# Patient Record
Sex: FEMALE | Race: WHITE | NOT HISPANIC OR LATINO | Employment: UNEMPLOYED | URBAN - METROPOLITAN AREA
[De-identification: names, ages, dates, MRNs, and addresses within clinical notes are randomized per-mention and may not be internally consistent; named-entity substitution may affect disease eponyms.]

---

## 2019-01-03 ENCOUNTER — OFFICE VISIT (OUTPATIENT)
Dept: FAMILY MEDICINE CLINIC | Facility: CLINIC | Age: 59
End: 2019-01-03
Payer: COMMERCIAL

## 2019-01-03 VITALS
SYSTOLIC BLOOD PRESSURE: 110 MMHG | OXYGEN SATURATION: 99 % | TEMPERATURE: 98.7 F | DIASTOLIC BLOOD PRESSURE: 76 MMHG | HEART RATE: 75 BPM

## 2019-01-03 DIAGNOSIS — J06.9 ACUTE URI: Primary | ICD-10-CM

## 2019-01-03 PROBLEM — G89.29 CHRONIC PAIN: Status: ACTIVE | Noted: 2019-01-03

## 2019-01-03 PROBLEM — M77.10 LATERAL EPICONDYLITIS: Status: ACTIVE | Noted: 2019-01-03

## 2019-01-03 PROBLEM — M25.539 WRIST JOINT PAIN: Status: ACTIVE | Noted: 2019-01-03

## 2019-01-03 PROCEDURE — 99213 OFFICE O/P EST LOW 20 MIN: CPT | Performed by: FAMILY MEDICINE

## 2019-01-03 RX ORDER — VALACYCLOVIR HYDROCHLORIDE 1 G/1
1000 TABLET, FILM COATED ORAL 2 TIMES DAILY
Qty: 14 TABLET | Refills: 2 | Status: SHIPPED | OUTPATIENT
Start: 2019-01-03 | End: 2020-09-25

## 2019-01-03 NOTE — PROGRESS NOTES
Assessment/Plan:       Problem List Items Addressed This Visit     Acute URI - Primary     Patient presents for URI symptoms present for about a week slightly improving at this point with sore throat dry cough no ear pain no fever at this time  Observe treat symptoms with Tylenol and fluids call if worse                 Subjective:      Patient ID: Benjamin England is a 63 y o  female  Patient presents with acute URI symptoms present for 1 week gradually were improving at this point she comes in today for follow-up evaluation to check and see if symptoms need further treatment at this time point  URI    Associated symptoms include coughing, rhinorrhea and a sore throat  Pertinent negatives include no abdominal pain, chest pain, congestion, dysuria, headaches, nausea or rash  The following portions of the patient's history were reviewed and updated as appropriate: allergies, current medications, past family history, past medical history, past social history, past surgical history and problem list     Review of Systems   Constitutional: Negative for chills, fatigue and fever  HENT: Positive for rhinorrhea and sore throat  Negative for congestion, nosebleeds and sinus pressure  Eyes: Negative for discharge and redness  Respiratory: Positive for cough  Negative for shortness of breath  Cardiovascular: Negative for chest pain, palpitations and leg swelling  Gastrointestinal: Negative for abdominal pain, blood in stool and nausea  Endocrine: Negative for cold intolerance, heat intolerance and polyuria  Genitourinary: Negative for dysuria and frequency  Musculoskeletal: Negative for arthralgias, back pain and myalgias  Skin: Negative for rash  Neurological: Negative for dizziness, weakness and headaches  Hematological: Negative for adenopathy  Psychiatric/Behavioral: Negative for behavioral problems and sleep disturbance  The patient is not nervous/anxious  Objective:      /76   Pulse 75   Temp 98 7 °F (37 1 °C)   SpO2 99%        Physical Exam   Constitutional: She is oriented to person, place, and time  She appears well-developed and well-nourished  No distress  HENT:   Head: Normocephalic and atraumatic  Right Ear: External ear normal    Left Ear: External ear normal    Nose: Nose normal    Mouth/Throat: Oropharynx is clear and moist  No oropharyngeal exudate  Eyes: Pupils are equal, round, and reactive to light  Conjunctivae and EOM are normal  Right eye exhibits no discharge  Left eye exhibits no discharge  No scleral icterus  Neck: Normal range of motion  No JVD present  No thyromegaly present  Cardiovascular: Normal rate, regular rhythm and normal heart sounds  No murmur heard  Pulmonary/Chest: Effort normal  She has no wheezes  She has no rales  She exhibits no tenderness  Abdominal: Soft  Bowel sounds are normal  She exhibits no distension and no mass  There is no tenderness  Musculoskeletal: Normal range of motion  She exhibits no edema, tenderness or deformity  Lymphadenopathy:     She has no cervical adenopathy  Neurological: She is alert and oriented to person, place, and time  She has normal reflexes  No cranial nerve deficit  Coordination normal    Skin: Skin is warm and dry  No rash noted  Psychiatric: She has a normal mood and affect  Her behavior is normal  Judgment and thought content normal    Nursing note and vitals reviewed         Data:    Laboratory Results:  Radiology/Other Diagnostic Testing Results:     No results found for: WBC, HGB, HCT, MCV, PLT  No results found for: NA, K, CL, CO2, ANIONGAP, BUN, CREATININE, GLUCOSE, GLUF, CALCIUM, CORRECTEDCA, AST, ALT, ALKPHOS, PROT, BILITOT, EGFR  No results found for: CHOLESTEROL  No results found for: HDL  No results found for: LDLCALC  No results found for: TRIG  No results found for: CHOLHDL  No results found for: [id number], TSH  No results found for: HGBA1C  No results found for: MALICK Ching, DO

## 2019-01-03 NOTE — ASSESSMENT & PLAN NOTE
Patient presents for URI symptoms present for about a week slightly improving at this point with sore throat dry cough no ear pain no fever at this time    Observe treat symptoms with Tylenol and fluids call if worse

## 2019-01-04 ENCOUNTER — TELEPHONE (OUTPATIENT)
Dept: OTHER | Facility: OTHER | Age: 59
End: 2019-01-04

## 2019-01-11 ENCOUNTER — TELEPHONE (OUTPATIENT)
Dept: FAMILY MEDICINE CLINIC | Facility: CLINIC | Age: 59
End: 2019-01-11

## 2019-01-11 DIAGNOSIS — R39.9 UTI SYMPTOMS: Primary | ICD-10-CM

## 2019-01-11 NOTE — TELEPHONE ENCOUNTER
Needs a rx faxed to [facility]     Poss   uti wants a rx for it please      Fax rx to her work fax  [phone]

## 2019-01-16 ENCOUNTER — TELEPHONE (OUTPATIENT)
Dept: FAMILY MEDICINE CLINIC | Facility: CLINIC | Age: 59
End: 2019-01-16

## 2019-01-16 NOTE — TELEPHONE ENCOUNTER
Call patient regarding her urinalysis results  It does show a few white blood cells in the specimen but negative for bacteria or any other problems or abnormalities  If she has having any symptoms or requires further treatment let me know  The white blood cell elements could be from a recent minor infection that is still showing up at this time  However if she feels worse or any changes occur over the next week we can repeat the urinalysis to be sure it is clear

## 2019-02-22 ENCOUNTER — OFFICE VISIT (OUTPATIENT)
Dept: FAMILY MEDICINE CLINIC | Facility: CLINIC | Age: 59
End: 2019-02-22
Payer: COMMERCIAL

## 2019-02-22 VITALS
HEART RATE: 68 BPM | WEIGHT: 141 LBS | SYSTOLIC BLOOD PRESSURE: 124 MMHG | OXYGEN SATURATION: 98 % | DIASTOLIC BLOOD PRESSURE: 68 MMHG

## 2019-02-22 DIAGNOSIS — R11.0 NAUSEA: ICD-10-CM

## 2019-02-22 DIAGNOSIS — R42 DIZZINESS: Primary | ICD-10-CM

## 2019-02-22 PROCEDURE — 99213 OFFICE O/P EST LOW 20 MIN: CPT | Performed by: FAMILY MEDICINE

## 2019-02-22 NOTE — PROGRESS NOTES
Assessment/Plan:       Problem List Items Addressed This Visit        Other    Dizziness - Primary     Patient complains of dizziness and fatigue feeling run down over the last 2 weeks she has had episodes of nausea with some discomfort and vomiting associated with change in bowel or bladder no fever chills associated with this  I will order lab work to follow up on this  If it continues we will order a CT of the head she is aware of this and will observe for change in symptoms physical exam is benign today I will refer her to Neurology if further workup is needed she wants to wait and see how things change over the next several weeks I recommend she stay hydrated take a multiple vitamin every day and get proper nutrition and rest          Nausea     Patient has had nausea with a couple episodes of vomiting denies gastritis or reflux or dyspepsia at this time  I do recommend starting with an H2 blocker such as Zantac or Pepcid if symptoms continue  She will watch her diet and watch for changes  Subjective:      Patient ID: Alaina Diaz is a 63 y o  female  Patient notes feeling run down and fatigued over the last several weeks mild postnasal drip otherwise no cough fever chills a change in bowel or bladder associated with this  Ppatient notes dizziness with some nausea and occasional episodes of vomiting cannot contribute it to any type of particular food she has also had loose bowels with diarrhea  Patient developed diarrhea on February 9th likely infectious as 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 there was an outbreak of diarrhea and a gastrointestinal virus which she caught it lasted for several days she became weak from this and dizzy  She recovered slowly however still feels drained at this point very weak and dizzy        The following portions of the patient's history were reviewed and updated as appropriate: allergies, current medications, past family history, past medical history, past social history, past surgical history and problem list     Review of Systems   Constitutional: Positive for fatigue  Negative for chills and fever  HENT: Positive for postnasal drip  Negative for congestion, nosebleeds, rhinorrhea, sinus pressure and sore throat  Eyes: Negative for discharge and redness  Respiratory: Negative for cough and shortness of breath  Cardiovascular: Negative for chest pain, palpitations and leg swelling  Gastrointestinal: Negative for abdominal pain, blood in stool and nausea  Endocrine: Negative for cold intolerance, heat intolerance and polyuria  Genitourinary: Negative for dysuria and frequency  Musculoskeletal: Negative for arthralgias, back pain and myalgias  Skin: Negative for rash  Neurological: Negative for dizziness, weakness and headaches  Hematological: Negative for adenopathy  Psychiatric/Behavioral: Negative for behavioral problems and sleep disturbance  The patient is not nervous/anxious  Objective:      /68 (BP Location: Left arm, Patient Position: Sitting)   Pulse 68   Wt 64 kg (141 lb)   SpO2 98%        Physical Exam   Constitutional: She is oriented to person, place, and time  She appears well-developed and well-nourished  No distress  HENT:   Head: Normocephalic and atraumatic  Right Ear: External ear normal    Left Ear: External ear normal    Nose: Nose normal    Mouth/Throat: Oropharynx is clear and moist  No oropharyngeal exudate  Eyes: Pupils are equal, round, and reactive to light  Conjunctivae and EOM are normal  Right eye exhibits no discharge  Left eye exhibits no discharge  No scleral icterus  Neck: Normal range of motion  No JVD present  No thyromegaly present  Cardiovascular: Normal rate, regular rhythm and normal heart sounds  No murmur heard  Pulmonary/Chest: Effort normal  She has no wheezes  She has no rales  She exhibits no tenderness  Abdominal: Soft   Bowel sounds are normal  She exhibits no distension and no mass  There is no tenderness  Musculoskeletal: Normal range of motion  She exhibits no edema, tenderness or deformity  Lymphadenopathy:     She has no cervical adenopathy  Neurological: She is alert and oriented to person, place, and time  She has normal reflexes  She displays normal reflexes  No cranial nerve deficit  Coordination normal    Skin: Skin is warm and dry  No rash noted  Psychiatric: She has a normal mood and affect  Her behavior is normal  Judgment and thought content normal    Nursing note and vitals reviewed  Data:    Laboratory Results: I have personally reviewed the pertinent laboratory results/reports   Radiology/Other Diagnostic Testing Results: I have personally reviewed pertinent reports         No results found for: WBC, HGB, HCT, MCV, PLT  No results found for: NA, K, CL, CO2, ANIONGAP, BUN, CREATININE, GLUCOSE, GLUF, CALCIUM, CORRECTEDCA, AST, ALT, ALKPHOS, PROT, BILITOT, EGFR  No results found for: CHOLESTEROL  No results found for: HDL  No results found for: LDLCALC  No results found for: TRIG  No results found for: CHOLHDL  No results found for: [id number], TSH  No results found for: HGBA1C  No results found for: PSA    [place], DO

## 2019-02-22 NOTE — ASSESSMENT & PLAN NOTE
Patient complains of dizziness and fatigue feeling run down over the last 2 weeks she has had episodes of nausea with some discomfort and vomiting associated with change in bowel or bladder no fever chills associated with this  I will order lab work to follow up on this    If it continues we will order a CT of the head she is aware of this and will observe for change in symptoms physical exam is benign today I will refer her to Neurology if further workup is needed she wants to wait and see how things change over the next several weeks I recommend she stay hydrated take a multiple vitamin every day and get proper nutrition and rest

## 2019-02-22 NOTE — ASSESSMENT & PLAN NOTE
Patient has had nausea with a couple episodes of vomiting denies gastritis or reflux or dyspepsia at this time  I do recommend starting with an H2 blocker such as Zantac or Pepcid if symptoms continue  She will watch her diet and watch for changes

## 2020-03-16 ENCOUNTER — TELEPHONE (OUTPATIENT)
Dept: FAMILY MEDICINE CLINIC | Facility: CLINIC | Age: 60
End: 2020-03-16

## 2020-03-16 NOTE — TELEPHONE ENCOUNTER
Needs letter for work stating she cannot be at work, works at [employer] and she has RSV   Fax to [phone]

## 2020-03-16 NOTE — TELEPHONE ENCOUNTER
I will need specific information on her with a diagnosis possibly from old records I do not have anything in my records signifying her medical need for remaining out of work or employment due to a criteria that matches the CDC current recommendations right now possibly in old records or she can provide me with additional information or medical assess [place] so I can provide a form and she may need official evaluation but 1st check in past medical history if something is available in amazing charts her otherwise?

## 2020-03-17 NOTE — TELEPHONE ENCOUNTER
Spoke to patient and she said that RSD is at greater risk of developing infections and the body has problems producing antibodies to fight infection  She has already discussed this with them at [facility]   If you cannot do it she will have to quit

## 2020-03-17 NOTE — TELEPHONE ENCOUNTER
Wendi Monk contact this patient in light of her request for possibly staying out of work  She has are SD and this does not fall under a specific category of immunocompromised conditions by CDC recommendations for the current Covid 19  She can discuss this further with her employer and see if they would allow her the next 10 days to remain out  Let her know that many requests coming in for different jobs and these issues need to also be clarified with patient's and employers  And I need to follow the specific guidelines provided to physicians   If she has specific other reason then or other comments to discuss she can come in and make an appointment with me to discuss this personally on individual case basis

## 2020-03-17 NOTE — TELEPHONE ENCOUNTER
I checked AC, the correct dx is RSD not RSV,  I printed the last 2 office notes but they are from 2017, I put them in your PDMP box

## 2020-03-17 NOTE — TELEPHONE ENCOUNTER
As I stated earlier I can provide her with a note of her employer is okay with this    Check the date on her last appointment here with me if she has been seen within the last year I can write her a note without her coming in if she has not been seen in over 1 year she will need an appointment in order to obtain a work excuse note additionally she needs to tell me the time frame on her absence from work

## 2020-04-29 ENCOUNTER — TELEPHONE (OUTPATIENT)
Dept: FAMILY MEDICINE CLINIC | Facility: CLINIC | Age: 60
End: 2020-04-29

## 2020-09-11 DIAGNOSIS — Z12.31 ENCOUNTER FOR SCREENING MAMMOGRAM FOR BREAST CANCER: Primary | ICD-10-CM

## 2020-09-25 ENCOUNTER — OFFICE VISIT (OUTPATIENT)
Dept: FAMILY MEDICINE CLINIC | Facility: CLINIC | Age: 60
End: 2020-09-25
Payer: COMMERCIAL

## 2020-09-25 VITALS
HEIGHT: 71 IN | BODY MASS INDEX: 20.16 KG/M2 | HEART RATE: 99 BPM | SYSTOLIC BLOOD PRESSURE: 120 MMHG | OXYGEN SATURATION: 87 % | DIASTOLIC BLOOD PRESSURE: 70 MMHG | WEIGHT: 144 LBS | TEMPERATURE: 97.9 F

## 2020-09-25 DIAGNOSIS — L30.9 FOOT DERMATITIS: ICD-10-CM

## 2020-09-25 DIAGNOSIS — M25.561 ACUTE PAIN OF RIGHT KNEE: Primary | ICD-10-CM

## 2020-09-25 DIAGNOSIS — G90.50 RSD (REFLEX SYMPATHETIC DYSTROPHY): ICD-10-CM

## 2020-09-25 PROCEDURE — 99214 OFFICE O/P EST MOD 30 MIN: CPT | Performed by: FAMILY MEDICINE

## 2020-09-25 PROCEDURE — 3725F SCREEN DEPRESSION PERFORMED: CPT | Performed by: FAMILY MEDICINE

## 2020-09-25 NOTE — PATIENT INSTRUCTIONS
Knee Sprain   WHAT YOU NEED TO KNOW:   A knee sprain occurs when one or more ligaments in your knee are suddenly stretched or torn  Ligaments are tissues that hold bones together  Ligaments support the knee and keep the joint and bones in the correct position  DISCHARGE INSTRUCTIONS:   Return to the emergency department if:   · Any part of your leg feels cold, numb, or looks pale     Contact your healthcare provider if:   · You have new or increased swelling, bruising, or pain in your knee  · Your symptoms do not improve within 6 weeks, even with treatment  · You have questions or concerns about your condition or care  Medicines:   · NSAIDs , such as ibuprofen, help decrease swelling, pain, and fever  This medicine is available with or without a doctor's order  NSAIDs can cause stomach bleeding or kidney problems in certain people  If you take blood thinner medicine, always ask your healthcare provider if NSAIDs are safe for you  Always read the medicine label and follow directions  · Acetaminophen  decreases pain and fever  It is available without a doctor's order  Ask how much to take and how often to take it  Follow directions  Read the labels of all other medicines you are using to see if they also contain acetaminophen, or ask your doctor or pharmacist  Acetaminophen can cause liver damage if not taken correctly  Do not use more than 4 grams (4,000 milligrams) total of acetaminophen in one day  · Prescription pain medicine  may be given  Ask how to take this medicine safely  · Take your medicine as directed  Contact your healthcare provider if you think your medicine is not helping or if you have side effects  Tell him or her if you are allergic to any medicine  Keep a list of the medicines, vitamins, and herbs you take  Include the amounts, and when and why you take them  Bring the list or the pill bottles to follow-up visits   Carry your medicine list with you in case of an emergency  Self-care:   · Rest  your knee and do not exercise  You may be told to keep weight off your knee  This means that you should not walk on your injured leg  Rest helps decrease swelling and allows the injury to heal  You can do gentle range of motion (ROM) exercises as directed  This will prevent stiffness  · Apply ice  on your knee for 15 to 20 minutes every hour or as directed  Use an ice pack, or put crushed ice in a plastic bag  Cover it with a towel  Ice helps prevent tissue damage and decreases swelling and pain  · Apply compression to your knee as directed  You may need to wear an elastic bandage  This helps keep your injured knee from moving too much while it heals  You can loosen or tighten the elastic bandage to make it comfortable  It should be tight enough for you to feel support  It should not be so tight that it causes your toes to feel numb or tingly  If you are wearing an elastic bandage, take it off and rewrap it once a day  · Elevate your knee  above the level of your heart as often as you can  This will help decrease swelling and pain  Prop your leg on pillows or blankets to keep it elevated comfortably  Do not put pillows directly behind your knee  · Use support devices as directed:  Support devices such as a splint or brace may be needed  These devices limit movement and protect your joint while it heals  You may be given crutches to use until you can stand on your injured leg without pain  Use devices as directed  Physical therapy:  A physical therapist teaches you exercises to help improve movement and strength, and to decrease pain  Prevent another knee sprain:  Exercise your legs to keep your muscles strong  Strong leg muscles help protect your knee and prevent strain  The following may also prevent a knee sprain:  · Slowly start your exercise or training program   Slowly increase the time, distance, and intensity of your exercise   Sudden increases in training may cause you to injure your knee again  · Wear protective braces and equipment as directed  Braces may prevent your knee from moving the wrong way and causing another sprain  Protective equipment may support your bones and ligaments to prevent injury  · Warm up and stretch before exercise  Warm up by walking or using an exercise bike before starting your regular exercise  Do gentle stretches after warming up  This helps to loosen your muscles and decrease stress on your knee  Cool down and stretch after you exercise  · Wear shoes that fit correctly and support your feet  Replace your running or exercise shoes before the padding or shock absorption is worn out  Ask your healthcare provider which exercise shoes are best for you  Ask if you should wear special shoe inserts  Shoe inserts can help support your heels and arches or keep your foot lined up correctly in your shoes  Exercise on flat surfaces  Follow up with your healthcare provider as directed:  Write down your questions so you remember to ask them during your visits  © 2017 [facility] [place] Information is for End User's use only and may not be sold, redistributed or otherwise used for commercial purposes  All illustrations and images included in CareNotes® are the copyrighted property of [employer] , [employer]  or Diego Nina  The above information is an  only  It is not intended as medical advice for individual conditions or treatments  Talk to your doctor, nurse or pharmacist before following any medical regimen to see if it is safe and effective for you

## 2020-09-25 NOTE — ASSESSMENT & PLAN NOTE
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 walking but recently started walking her dog on uneven surfaces and the knees aggravated again on examination positive Maria Del Rosario's sign with pain and crepitance in the medial meniscus region and is likely that she has an internal derangement with the medial meniscus and possibly medial collateral ligament  Both her mild and she will do quadriceps strengthening exercises at home for this including straight leg raising and leg extension exercises from a seated position she can be referred over to physical therapy if symptoms do not improve she is going to use ice and moist heat now    I recommend diclofenac gel twice daily she will follow-up with me in contact me after 2 weeks

## 2020-09-25 NOTE — ASSESSMENT & PLAN NOTE
RST by history patient is concerned about her right leg pain as to relating to this condition however after examination and explanation patient is reassured this is not related to her RSD

## 2020-09-25 NOTE — ASSESSMENT & PLAN NOTE
3 centimeter circumscribed erythematous rash on inner aspect of right foot below her heel at the plantar arch she will apply hydrocortisone 2 5 percent cream over this area now and contact me if not improved I recommend wearing socks and shoes over the next several weeks to protect this area

## 2020-09-25 NOTE — PROGRESS NOTES
Assessment/Plan:       Problem List Items Addressed This Visit        Nervous and Auditory    RSD (reflex sympathetic dystrophy)      RST by history patient is concerned about her right leg pain as to relating to this condition however after examination and explanation patient is reassured this is not related to her RSD            Musculoskeletal and Integument    Foot dermatitis      3 centimeter circumscribed erythematous rash on inner aspect of right foot below her heel at the plantar arch she will apply hydrocortisone 2 5 percent cream over this area now and contact me if not improved I recommend wearing socks and shoes over the next several weeks to protect this area            Other    Acute pain of right knee - Primary     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 walking but recently started walking her dog on uneven surfaces and the knees aggravated again on examination positive Maria Del Rosario's sign with pain and crepitance in the medial meniscus region and is likely that she has an internal derangement with the medial meniscus and possibly medial collateral ligament  Both her mild and she will do quadriceps strengthening exercises at home for this including straight leg raising and leg extension exercises from a seated position she can be referred over to physical therapy if symptoms do not improve she is going to use ice and moist heat now  I recommend diclofenac gel twice daily she will follow-up with me in contact me after 2 weeks                 Subjective:      Patient ID: Lori Adams is a 62 y o  female      Patient presents for right knee pain which has been bothering her over the last several weeks and swelling at times      The following portions of the patient's history were reviewed and updated as appropriate: allergies, current medications, past family history, past medical history, past social history, past surgical history and problem list     Review of Systems   Constitutional: Negative for chills, fatigue and fever  HENT: Negative for congestion, nosebleeds, rhinorrhea, sinus pressure and sore throat  Eyes: Negative for discharge and redness  Respiratory: Negative for cough and shortness of breath  Cardiovascular: Negative for chest pain, palpitations and leg swelling  Gastrointestinal: Negative for abdominal pain, blood in stool and nausea  Endocrine: Negative for cold intolerance, heat intolerance and polyuria  Genitourinary: Negative for dysuria and frequency  Musculoskeletal: Negative for arthralgias, back pain and myalgias  Skin: Negative for rash  Neurological: Negative for dizziness, weakness and headaches  Hematological: Negative for adenopathy  Psychiatric/Behavioral: Negative for behavioral problems and sleep disturbance  The patient is not nervous/anxious  Objective:      /70 (BP Location: Left arm, Patient Position: Sitting)   Pulse 99   Temp 97 9 °F (36 6 °C)   Ht 5' 11" (1 803 m)   Wt 65 3 kg (144 lb)   SpO2 (!) 87%   BMI 20 08 kg/m²        Physical Exam  Vitals signs and nursing note reviewed  Constitutional:       General: She is not in acute distress  Appearance: She is well-developed  HENT:      Head: Normocephalic and atraumatic  Right Ear: External ear normal       Left Ear: External ear normal       Nose: Nose normal       Mouth/Throat:      Pharynx: No oropharyngeal exudate  Eyes:      General: No scleral icterus  Right eye: No discharge  Left eye: No discharge        Conjunctiva/sclera: Conjunctivae normal       Pupils: Pupils are equal, round, and reactive to light  Neck:      Musculoskeletal: Normal range of motion  Thyroid: No thyromegaly  Vascular: No JVD  Cardiovascular:      Rate and Rhythm: Normal rate and regular rhythm  Heart sounds: Normal heart sounds  No murmur  Pulmonary:      Effort: Pulmonary effort is normal       Breath sounds: No wheezing or rales  Chest:      Chest wall: No tenderness  Abdominal:      General: Bowel sounds are normal  There is no distension  Palpations: Abdomen is soft  There is no mass  Tenderness: There is no abdominal tenderness  Musculoskeletal:         General: No tenderness or deformity  Comments: Right knee pain medial meniscus and positive Maria Del Rosario's   Lymphadenopathy:      Cervical: No cervical adenopathy  Skin:     General: Skin is warm and dry  Findings: No rash  Neurological:      Mental Status: She is alert and oriented to person, place, and time  Cranial Nerves: No cranial nerve deficit  Coordination: Coordination normal       Deep Tendon Reflexes: Reflexes are normal and symmetric  Reflexes normal    Psychiatric:         Behavior: Behavior normal          Thought Content:  Thought content normal          Judgment: Judgment normal           Data:    Laboratory Results:   Radiology/Other Diagnostic Testing Results:      No results found for: WBC, HGB, HCT, MCV, PLT  No results found for: NA, K, CL, CO2, ANIONGAP, BUN, CREATININE, GLUCOSE, GLUF, CALCIUM, CORRECTEDCA, AST, ALT, ALKPHOS, PROT, BILITOT, EGFR  No results found for: CHOLESTEROL  No results found for: HDL  No results found for: LDLCALC  No results found for: TRIG  No results found for: CHOLHDL  No results found for: [id number], TSH  No results found for: HGBA1C  No results found for: MALICK Gomez,

## 2020-10-14 ENCOUNTER — OFFICE VISIT (OUTPATIENT)
Dept: FAMILY MEDICINE CLINIC | Facility: CLINIC | Age: 60
End: 2020-10-14
Payer: COMMERCIAL

## 2020-10-14 VITALS
HEIGHT: 71 IN | BODY MASS INDEX: 20.24 KG/M2 | DIASTOLIC BLOOD PRESSURE: 72 MMHG | SYSTOLIC BLOOD PRESSURE: 122 MMHG | HEART RATE: 87 BPM | TEMPERATURE: 98.4 F | WEIGHT: 144.6 LBS | OXYGEN SATURATION: 98 %

## 2020-10-14 DIAGNOSIS — H83.01 LABYRINTHITIS OF RIGHT EAR: Primary | ICD-10-CM

## 2020-10-14 PROCEDURE — 99213 OFFICE O/P EST LOW 20 MIN: CPT | Performed by: FAMILY MEDICINE

## 2020-10-14 RX ORDER — MECLIZINE HCL 12.5 MG/1
25 TABLET ORAL 3 TIMES DAILY PRN
Qty: 30 TABLET | Refills: 0 | Status: SHIPPED | OUTPATIENT
Start: 2020-10-14

## 2021-08-26 ENCOUNTER — OFFICE VISIT (OUTPATIENT)
Dept: FAMILY MEDICINE CLINIC | Facility: CLINIC | Age: 61
End: 2021-08-26
Payer: COMMERCIAL

## 2021-08-26 VITALS
OXYGEN SATURATION: 97 % | HEART RATE: 82 BPM | WEIGHT: 143 LBS | HEIGHT: 71 IN | BODY MASS INDEX: 20.02 KG/M2 | SYSTOLIC BLOOD PRESSURE: 122 MMHG | RESPIRATION RATE: 18 BRPM | TEMPERATURE: 99.3 F | DIASTOLIC BLOOD PRESSURE: 64 MMHG

## 2021-08-26 DIAGNOSIS — H92.01 RIGHT EAR PAIN: Primary | ICD-10-CM

## 2021-08-26 DIAGNOSIS — R10.13 EPIGASTRIC ABDOMINAL PAIN: ICD-10-CM

## 2021-08-26 DIAGNOSIS — Z13.0 SCREENING FOR BLOOD DISEASE: ICD-10-CM

## 2021-08-26 DIAGNOSIS — Z13.29 SCREENING FOR THYROID DISORDER: Primary | ICD-10-CM

## 2021-08-26 DIAGNOSIS — K58.0 IRRITABLE BOWEL SYNDROME WITH DIARRHEA: ICD-10-CM

## 2021-08-26 DIAGNOSIS — Z13.228 SCREENING FOR METABOLIC DISORDER: ICD-10-CM

## 2021-08-26 DIAGNOSIS — Z13.220 SCREENING, LIPID: ICD-10-CM

## 2021-08-26 PROCEDURE — 99214 OFFICE O/P EST MOD 30 MIN: CPT | Performed by: FAMILY MEDICINE

## 2021-08-26 PROCEDURE — 3008F BODY MASS INDEX DOCD: CPT | Performed by: FAMILY MEDICINE

## 2021-08-26 RX ORDER — PANTOPRAZOLE SODIUM 40 MG/1
40 TABLET, DELAYED RELEASE ORAL
Qty: 30 TABLET | Refills: 5 | Status: SHIPPED | OUTPATIENT
Start: 2021-08-26

## 2021-08-26 NOTE — ASSESSMENT & PLAN NOTE
Patient came in concerned about blockage in her right ear and ringing in the ear at times this is more likely related to eustachian tube dysfunction as her ear is clear she wanted it checked for earwax buildup    She will now use meclizine as needed and Flonase and add a decongestant like Sudafed 12 hour if needed if symptoms do not improve she will contact me in 1 week for possibility of adding prednisone but will hold on this for now as her stomach has been upset with gastritis and epigastric pain

## 2021-08-26 NOTE — ASSESSMENT & PLAN NOTE
Patient has several days of diarrhea and became somewhat dehydrated and then developed epigastric pain symptoms since have resolved but she continues with epigastric pain will treat this with pantoprazole now for 2 weeks  She will contact me if symptoms change or worsen    She notes that she does not have blood in her stool but will watch for this further referral to Gastroenterology can be done if symptoms do not improve

## 2021-08-26 NOTE — ASSESSMENT & PLAN NOTE
This is likely due to stress and patient additionally has GERD symptoms with this with burning into her throat I recommend that she start on a proton pump inhibitor over the next 2 weeks and then contact me with a follow-up evaluation and possibly referral for gastroenterology evaluation

## 2021-08-26 NOTE — PROGRESS NOTES
Assessment/Plan:       Problem List Items Addressed This Visit        Digestive    Irritable bowel syndrome with diarrhea     Patient has several days of diarrhea and became somewhat dehydrated and then developed epigastric pain symptoms since have resolved but she continues with epigastric pain will treat this with pantoprazole now for 2 weeks  She will contact me if symptoms change or worsen  She notes that she does not have blood in her stool but will watch for this further referral to Gastroenterology can be done if symptoms do not improve            Other    Right ear pain - Primary      Patient came in concerned about blockage in her right ear and ringing in the ear at times this is more likely related to eustachian tube dysfunction as her ear is clear she wanted it checked for earwax buildup  She will now use meclizine as needed and Flonase and add a decongestant like Sudafed 12 hour if needed if symptoms do not improve she will contact me in 1 week for possibility of adding prednisone but will hold on this for now as her stomach has been upset with gastritis and epigastric pain         Epigastric abdominal pain      This is likely due to stress and patient additionally has GERD symptoms with this with burning into her throat I recommend that she start on a proton pump inhibitor over the next 2 weeks and then contact me with a follow-up evaluation and possibly referral for gastroenterology evaluation         Relevant Medications    pantoprazole (PROTONIX) 40 mg tablet            Subjective:      Patient ID: Christie Lr is a 62 y o  female       Patient presents for GERD symptoms abdominal pain epigastric over the last week also blocked feeling in her right ear with ringing in tinnitus      The following portions of the patient's history were reviewed and updated as appropriate: allergies, current medications, past family history, past medical history, past social history, past surgical history and problem list     Review of Systems   Constitutional: Negative for chills, fatigue and fever  HENT: Positive for ear pain and tinnitus  Negative for congestion, nosebleeds, rhinorrhea, sinus pressure and sore throat  Eyes: Negative for discharge and redness  Respiratory: Negative for cough and shortness of breath  Cardiovascular: Negative for chest pain, palpitations and leg swelling  Gastrointestinal: Positive for abdominal pain  Negative for blood in stool and nausea  Endocrine: Negative for cold intolerance, heat intolerance and polyuria  Genitourinary: Negative for dysuria and frequency  Musculoskeletal: Negative for arthralgias, back pain and myalgias  Skin: Negative for rash  Neurological: Negative for dizziness, weakness and headaches  Hematological: Negative for adenopathy  Psychiatric/Behavioral: Negative for behavioral problems and sleep disturbance  The patient is not nervous/anxious  Objective:      /64 (BP Location: Left arm, Patient Position: Sitting)   Pulse 82   Temp 99 3 °F (37 4 °C)   Resp 18   Ht 5' 11" (1 803 m)   Wt 64 9 kg (143 lb)   SpO2 97%   BMI 19 94 kg/m²        Physical Exam  Vitals and nursing note reviewed  Constitutional:       General: She is not in acute distress  Appearance: She is well-developed and normal weight  HENT:      Head: Normocephalic and atraumatic  Comments:  Right ear tympanic membrane dull left ear intact negative cerumen in both auditory canals     Right Ear: External ear normal       Left Ear: External ear normal       Nose: Nose normal       Mouth/Throat:      Mouth: Mucous membranes are moist       Pharynx: Oropharynx is clear  No oropharyngeal exudate  Eyes:      General: No scleral icterus  Right eye: No discharge  Left eye: No discharge  Extraocular Movements: Extraocular movements intact        Conjunctiva/sclera: Conjunctivae normal       Pupils: Pupils are equal, round, and reactive to light  Neck:      Thyroid: No thyromegaly  Vascular: No JVD  Cardiovascular:      Rate and Rhythm: Normal rate and regular rhythm  Heart sounds: Normal heart sounds  No murmur heard  Pulmonary:      Effort: Pulmonary effort is normal       Breath sounds: Normal breath sounds  No wheezing or rales  Chest:      Chest wall: No tenderness  Abdominal:      General: Bowel sounds are normal  There is no distension  Palpations: Abdomen is soft  There is no mass  Tenderness: There is abdominal tenderness in the epigastric area  There is no right CVA tenderness, left CVA tenderness, guarding or rebound  Negative signs include Camargo's sign, Rovsing's sign, McBurney's sign, psoas sign and obturator sign  Hernia: No hernia is present  Musculoskeletal:         General: No tenderness or deformity  Normal range of motion  Cervical back: Normal range of motion  Lymphadenopathy:      Cervical: No cervical adenopathy  Skin:     General: Skin is warm and dry  Findings: No rash  Neurological:      Mental Status: She is alert and oriented to person, place, and time  Cranial Nerves: No cranial nerve deficit  Coordination: Coordination normal       Deep Tendon Reflexes: Reflexes are normal and symmetric  Reflexes normal    Psychiatric:         Behavior: Behavior normal          Thought Content: Thought content normal          Judgment: Judgment normal           Data:    Laboratory Results: I have personally reviewed the pertinent laboratory results/reports   Radiology/Other Diagnostic Testing Results: I have personally reviewed pertinent reports         No results found for: WBC, HGB, HCT, MCV, PLT  No results found for: NA, K, CL, CO2, ANIONGAP, BUN, CREATININE, GLUCOSE, GLUF, CALCIUM, CORRECTEDCA, AST, ALT, ALKPHOS, PROT, BILITOT, EGFR  No results found for: CHOLESTEROL  No results found for: HDL  No results found for: LDLCALC  No results found for: TRIG  No results found for: CHOLHDL  No results found for: [id number], TSH  No results found for: HGBA1C  No results found for: PSA    Candie Bender, DO

## 2021-10-01 ENCOUNTER — OFFICE VISIT (OUTPATIENT)
Dept: FAMILY MEDICINE CLINIC | Facility: CLINIC | Age: 61
End: 2021-10-01
Payer: COMMERCIAL

## 2021-10-01 DIAGNOSIS — R09.81 SINUS CONGESTION: Primary | ICD-10-CM

## 2021-10-01 PROCEDURE — 99213 OFFICE O/P EST LOW 20 MIN: CPT | Performed by: NURSE PRACTITIONER

## 2021-10-01 PROCEDURE — U0005 INFEC AGEN DETEC AMPLI PROBE: HCPCS | Performed by: NURSE PRACTITIONER

## 2021-10-01 PROCEDURE — U0003 INFECTIOUS AGENT DETECTION BY NUCLEIC ACID (DNA OR RNA); SEVERE ACUTE RESPIRATORY SYNDROME CORONAVIRUS 2 (SARS-COV-2) (CORONAVIRUS DISEASE [COVID-19]), AMPLIFIED PROBE TECHNIQUE, MAKING USE OF HIGH THROUGHPUT TECHNOLOGIES AS DESCRIBED BY CMS-2020-01-R: HCPCS | Performed by: NURSE PRACTITIONER

## 2021-10-02 LAB — SARS-COV-2 RNA RESP QL NAA+PROBE: NEGATIVE

## 2021-10-16 ENCOUNTER — APPOINTMENT (OUTPATIENT)
Dept: LAB | Facility: CLINIC | Age: 61
End: 2021-10-16
Payer: COMMERCIAL

## 2021-10-16 DIAGNOSIS — Z13.0 SCREENING FOR BLOOD DISEASE: ICD-10-CM

## 2021-10-16 DIAGNOSIS — Z13.228 SCREENING FOR METABOLIC DISORDER: ICD-10-CM

## 2021-10-16 DIAGNOSIS — Z13.29 SCREENING FOR THYROID DISORDER: ICD-10-CM

## 2021-10-16 DIAGNOSIS — Z13.220 SCREENING, LIPID: ICD-10-CM

## 2021-10-16 LAB
ALBUMIN SERPL BCP-MCNC: 4.1 G/DL (ref 3.5–5)
ALP SERPL-CCNC: 92 U/L (ref 46–116)
ALT SERPL W P-5'-P-CCNC: 19 U/L (ref 12–78)
ANION GAP SERPL CALCULATED.3IONS-SCNC: 4 MMOL/L (ref 4–13)
AST SERPL W P-5'-P-CCNC: 13 U/L (ref 5–45)
BASOPHILS # BLD AUTO: 0.1 THOUSANDS/ΜL (ref 0–0.1)
BASOPHILS NFR BLD AUTO: 2 % (ref 0–1)
BILIRUB SERPL-MCNC: 1.09 MG/DL (ref 0.2–1)
BUN SERPL-MCNC: 16 MG/DL (ref 5–25)
CALCIUM SERPL-MCNC: 9.6 MG/DL (ref 8.3–10.1)
CHLORIDE SERPL-SCNC: 108 MMOL/L (ref 100–108)
CHOLEST SERPL-MCNC: 212 MG/DL (ref 50–200)
CO2 SERPL-SCNC: 26 MMOL/L (ref 21–32)
CREAT SERPL-MCNC: 0.76 MG/DL (ref 0.6–1.3)
EOSINOPHIL # BLD AUTO: 0.31 THOUSAND/ΜL (ref 0–0.61)
EOSINOPHIL NFR BLD AUTO: 5 % (ref 0–6)
ERYTHROCYTE [DISTWIDTH] IN BLOOD BY AUTOMATED COUNT: 14.3 % (ref 11.6–15.1)
GFR SERPL CREATININE-BSD FRML MDRD: 86 ML/MIN/1.73SQ M
GLUCOSE P FAST SERPL-MCNC: 107 MG/DL (ref 65–99)
HCT VFR BLD AUTO: 44.7 % (ref 34.8–46.1)
HDLC SERPL-MCNC: 73 MG/DL
HGB BLD-MCNC: 14.5 G/DL (ref 11.5–15.4)
IMM GRANULOCYTES # BLD AUTO: 0 THOUSAND/UL (ref 0–0.2)
IMM GRANULOCYTES NFR BLD AUTO: 0 % (ref 0–2)
LDLC SERPL CALC-MCNC: 125 MG/DL (ref 0–100)
LYMPHOCYTES # BLD AUTO: 2.53 THOUSANDS/ΜL (ref 0.6–4.47)
LYMPHOCYTES NFR BLD AUTO: 42 % (ref 14–44)
MCH RBC QN AUTO: 29.8 PG (ref 26.8–34.3)
MCHC RBC AUTO-ENTMCNC: 32.4 G/DL (ref 31.4–37.4)
MCV RBC AUTO: 92 FL (ref 82–98)
MONOCYTES # BLD AUTO: 0.39 THOUSAND/ΜL (ref 0.17–1.22)
MONOCYTES NFR BLD AUTO: 6 % (ref 4–12)
NEUTROPHILS # BLD AUTO: 2.73 THOUSANDS/ΜL (ref 1.85–7.62)
NEUTS SEG NFR BLD AUTO: 45 % (ref 43–75)
NONHDLC SERPL-MCNC: 139 MG/DL
NRBC BLD AUTO-RTO: 0 /100 WBCS
PLATELET # BLD AUTO: 355 THOUSANDS/UL (ref 149–390)
PMV BLD AUTO: 9.5 FL (ref 8.9–12.7)
POTASSIUM SERPL-SCNC: 4.1 MMOL/L (ref 3.5–5.3)
PROT SERPL-MCNC: 7.7 G/DL (ref 6.4–8.2)
RBC # BLD AUTO: 4.87 MILLION/UL (ref 3.81–5.12)
SODIUM SERPL-SCNC: 138 MMOL/L (ref 136–145)
TRIGL SERPL-MCNC: 68 MG/DL
TSH SERPL DL<=0.05 MIU/L-ACNC: 1.36 UIU/ML (ref 0.36–3.74)
WBC # BLD AUTO: 6.06 THOUSAND/UL (ref 4.31–10.16)

## 2021-10-16 PROCEDURE — 84443 ASSAY THYROID STIM HORMONE: CPT

## 2021-10-16 PROCEDURE — 85025 COMPLETE CBC W/AUTO DIFF WBC: CPT

## 2021-10-16 PROCEDURE — 80061 LIPID PANEL: CPT

## 2021-10-16 PROCEDURE — 80053 COMPREHEN METABOLIC PANEL: CPT

## 2021-10-16 PROCEDURE — 36415 COLL VENOUS BLD VENIPUNCTURE: CPT

## 2021-12-29 ENCOUNTER — VBI (OUTPATIENT)
Dept: ADMINISTRATIVE | Facility: OTHER | Age: 61
End: 2021-12-29

## 2022-01-04 ENCOUNTER — TELEMEDICINE (OUTPATIENT)
Dept: FAMILY MEDICINE CLINIC | Facility: CLINIC | Age: 62
End: 2022-01-04
Payer: COMMERCIAL

## 2022-01-04 DIAGNOSIS — T63.304A SPIDER BITE WOUND, UNDETERMINED INTENT, INITIAL ENCOUNTER: Primary | ICD-10-CM

## 2022-01-04 DIAGNOSIS — Z12.11 SCREENING FOR COLORECTAL CANCER: ICD-10-CM

## 2022-01-04 DIAGNOSIS — Z12.12 SCREENING FOR COLORECTAL CANCER: ICD-10-CM

## 2022-01-04 DIAGNOSIS — Z12.31 ENCOUNTER FOR SCREENING MAMMOGRAM FOR BREAST CANCER: ICD-10-CM

## 2022-01-04 PROBLEM — T63.301A SPIDER BITE: Status: ACTIVE | Noted: 2022-01-04

## 2022-01-04 PROCEDURE — 99213 OFFICE O/P EST LOW 20 MIN: CPT | Performed by: FAMILY MEDICINE

## 2022-01-04 NOTE — ASSESSMENT & PLAN NOTE
Slow healing area right anterior tibial surface  Continue to apply guaze and recommend light pressure to this as well as addressing with Neosporin ointment or Vaseline verses Desitin  She will re-evaluate with me in 1 or 2 weeks depending on healing process

## 2022-01-04 NOTE — PROGRESS NOTES
Virtual Regular Visit    Verification of patient location:    Patient is located in the following state in which I hold an active license [place]      Assessment/Plan:    Problem List Items Addressed This Visit        Other    Spider bite - Primary     Slow healing area right anterior tibial surface  Continue to apply guaze and recommend light pressure to this as well as addressing with Neosporin ointment or Vaseline verses Desitin  She will re-evaluate with me in 1 or 2 weeks depending on healing process  Other Visit Diagnoses     Encounter for screening mammogram for breast cancer        Screening for colorectal cancer                   Reason for visit is   Chief Complaint   Patient presents with    Follow-up     labs, spider bite right leg        Encounter provider Alissa Mayes DO    [facility]  [address] [address]  [phone]      Recent Visits  No visits were found meeting these conditions  Showing recent visits within past 7 days and meeting all other requirements  Today's Visits  Date Type Provider Dept   01/04/22 Telemedicine Alissa Mayes DO [address] [facility] today's visits and meeting all other requirements  Future Appointments  No visits were found meeting these conditions  Showing future appointments within next 150 days and meeting all other requirements       The patient was identified by name and date of birth  Sharmaine Blade was informed that this is a telemedicine visit and that the visit is being conducted through [address] [place] [address] Now and patient was informed that this is a secure, HIPAA-compliant platform  She agrees to proceed     My office door was closed  No one else was in the room  She acknowledged consent and understanding of privacy and security of the video platform  The patient has agreed to participate and understands they can discontinue the visit at any time      Patient is aware this is a billable service  Subjective  Girish Jones is a 62 y o  female    Spider bite right leg for about 1 week present over anterior tibial surface irritated but improving       Past Medical History:   Diagnosis Date    Gilbert's disease     Mono exposure     RSD (reflex sympathetic dystrophy)        Past Surgical History:   Procedure Laterality Date    WRIST SURGERY         Current Outpatient Medications   Medication Sig Dispense Refill    hydrocortisone 2 5 % cream Apply topically 2 (two) times a day (Patient not taking: Reported on 8/26/2021) 30 g 1    meclizine (ANTIVERT) 12 5 MG tablet Take 2 tablets (25 mg total) by mouth 3 (three) times a day as needed for dizziness (Patient not taking: Reported on 8/26/2021) 30 tablet 0    pantoprazole (PROTONIX) 40 mg tablet Take 1 tablet (40 mg total) by mouth daily before breakfast (Patient not taking: Reported on 1/4/2022 ) 30 tablet 5     No current facility-administered medications for this visit  Allergies   Allergen Reactions    Penicillins Anaphylaxis    Erythromycin Base Hives    Sulfa Antibiotics Hives       Review of Systems   Constitutional: Negative for chills, fatigue and fever  HENT: Negative for congestion, nosebleeds, rhinorrhea, sinus pressure and sore throat  Eyes: Negative for discharge and redness  Respiratory: Negative for cough and shortness of breath  Cardiovascular: Negative for chest pain, palpitations and leg swelling  Gastrointestinal: Negative for abdominal pain, blood in stool and nausea  Endocrine: Negative for cold intolerance, heat intolerance and polyuria  Genitourinary: Negative for dysuria and frequency  Musculoskeletal: Negative for arthralgias, back pain and myalgias  Skin: Positive for rash  Neurological: Negative for dizziness, weakness and headaches  Hematological: Negative for adenopathy     Psychiatric/Behavioral: Negative for behavioral problems and sleep disturbance  The patient is not nervous/anxious  Video Exam    There were no vitals filed for this visit  Physical Exam  Vitals and nursing note reviewed  Constitutional:       General: She is not in acute distress  Appearance: Normal appearance  She is well-developed and normal weight  HENT:      Head: Normocephalic and atraumatic  Right Ear: Tympanic membrane and external ear normal       Left Ear: Tympanic membrane and external ear normal       Nose: Nose normal       Mouth/Throat:      Mouth: Mucous membranes are moist       Pharynx: Oropharynx is clear  No oropharyngeal exudate  Eyes:      General: No scleral icterus  Right eye: No discharge  Left eye: No discharge  Extraocular Movements: Extraocular movements intact  Conjunctiva/sclera: Conjunctivae normal       Pupils: Pupils are equal, round, and reactive to light  Neck:      Thyroid: No thyromegaly  Vascular: No JVD  Cardiovascular:      Rate and Rhythm: Normal rate and regular rhythm  Heart sounds: Normal heart sounds  No murmur heard  Pulmonary:      Effort: Pulmonary effort is normal       Breath sounds: No wheezing or rales  Chest:      Chest wall: No tenderness  Abdominal:      General: Bowel sounds are normal  There is no distension  Palpations: Abdomen is soft  There is no mass  Tenderness: There is no abdominal tenderness  Musculoskeletal:         General: No tenderness or deformity  Normal range of motion  Cervical back: Normal range of motion  Lymphadenopathy:      Cervical: No cervical adenopathy  Skin:     General: Skin is warm and dry  Capillary Refill: Capillary refill takes less than 2 seconds  Findings: No rash  Neurological:      General: No focal deficit present  Mental Status: She is alert and oriented to person, place, and time  Mental status is at baseline  Cranial Nerves: No cranial nerve deficit        Coordination: Coordination normal       Deep Tendon Reflexes: Reflexes are normal and symmetric  Reflexes normal    Psychiatric:         Mood and Affect: Mood normal          Behavior: Behavior normal          Thought Content: Thought content normal          Judgment: Judgment normal           I spent 20 minutes directly with the patient during this visit    Vitaly verbally agrees to participate in [employer]  Pt is aware that [employer] could be limited without vital signs or the ability to perform a full hands-on physical exam  Helen Garrido understands she or the provider may request at any time to terminate the video visit and request the patient to seek care or treatment in person

## 2022-02-07 ENCOUNTER — OFFICE VISIT (OUTPATIENT)
Dept: FAMILY MEDICINE CLINIC | Facility: CLINIC | Age: 62
End: 2022-02-07
Payer: COMMERCIAL

## 2022-02-07 VITALS
OXYGEN SATURATION: 100 % | SYSTOLIC BLOOD PRESSURE: 130 MMHG | HEIGHT: 71 IN | RESPIRATION RATE: 18 BRPM | WEIGHT: 145.6 LBS | TEMPERATURE: 98.8 F | DIASTOLIC BLOOD PRESSURE: 62 MMHG | BODY MASS INDEX: 20.38 KG/M2 | HEART RATE: 78 BPM

## 2022-02-07 DIAGNOSIS — A46 ERYSIPELAS OF BOTH LOWER EXTREMITIES: Primary | ICD-10-CM

## 2022-02-07 PROCEDURE — 99213 OFFICE O/P EST LOW 20 MIN: CPT | Performed by: FAMILY MEDICINE

## 2022-02-07 PROCEDURE — 3725F SCREEN DEPRESSION PERFORMED: CPT | Performed by: FAMILY MEDICINE

## 2022-02-07 NOTE — PROGRESS NOTES
Assessment/Plan:       Problem List Items Addressed This Visit        Musculoskeletal and Integument    Erysipelas of both lower extremities - Primary     Erysipelas type rash possibly of both lower extremities this type of rash difficult to diagnose but based on history I will start her on antibiotic and refer her over for dermatology evaluation         Relevant Orders    Ambulatory Referral to Dermatology            Subjective:      Patient ID: Janet Castillo is a 62 y o  female  Patient presents for ongoing rash over the last 6 weeks which has worsened      The following portions of the patient's history were reviewed and updated as appropriate: allergies, current medications, past family history, past medical history, past social history, past surgical history and problem list     Review of Systems   Constitutional: Negative for chills, fatigue and fever  HENT: Negative for congestion, nosebleeds, rhinorrhea, sinus pressure and sore throat  Eyes: Negative for discharge and redness  Respiratory: Negative for cough and shortness of breath  Cardiovascular: Negative for chest pain, palpitations and leg swelling  Gastrointestinal: Negative for abdominal pain, blood in stool and nausea  Endocrine: Negative for cold intolerance, heat intolerance and polyuria  Genitourinary: Negative for dysuria and frequency  Musculoskeletal: Negative for arthralgias, back pain and myalgias  Skin: Positive for rash  Neurological: Negative for dizziness, weakness and headaches  Hematological: Negative for adenopathy  Psychiatric/Behavioral: Negative for behavioral problems and sleep disturbance  The patient is not nervous/anxious  Objective:      /62 (BP Location: Right arm, Patient Position: Sitting)   Pulse 78   Temp 98 8 °F (37 1 °C)   Resp 18   Ht 5' 11" (1 803 m)   Wt 66 kg (145 lb 9 6 oz)   SpO2 100%   BMI 20 31 kg/m²        Physical Exam  Vitals and nursing note reviewed  Constitutional:       General: She is not in acute distress  Appearance: She is well-developed  HENT:      Head: Normocephalic and atraumatic  Right Ear: External ear normal       Left Ear: External ear normal       Nose: Nose normal       Mouth/Throat:      Pharynx: No oropharyngeal exudate  Eyes:      General: No scleral icterus  Right eye: No discharge  Left eye: No discharge  Conjunctiva/sclera: Conjunctivae normal       Pupils: Pupils are equal, round, and reactive to light  Neck:      Thyroid: No thyromegaly  Vascular: No JVD  Cardiovascular:      Rate and Rhythm: Normal rate and regular rhythm  Heart sounds: Normal heart sounds  No murmur heard  Pulmonary:      Effort: Pulmonary effort is normal       Breath sounds: No wheezing or rales  Chest:      Chest wall: No tenderness  Abdominal:      General: Bowel sounds are normal  There is no distension  Palpations: Abdomen is soft  There is no mass  Tenderness: There is no abdominal tenderness  Musculoskeletal:         General: No tenderness or deformity  Normal range of motion  Cervical back: Normal range of motion  Lymphadenopathy:      Cervical: No cervical adenopathy  Skin:     General: Skin is warm and dry  Findings: No rash  Comments: Patchy erythematous rash over both lower extremities excoriated in some areas with serosanguineous drainage   Neurological:      Mental Status: She is alert and oriented to person, place, and time  Cranial Nerves: No cranial nerve deficit  Coordination: Coordination normal       Deep Tendon Reflexes: Reflexes are normal and symmetric  Reflexes normal    Psychiatric:         Behavior: Behavior normal          Thought Content:  Thought content normal          Judgment: Judgment normal           Data:    Laboratory Results: I have personally reviewed the pertinent laboratory results/reports   Radiology/Other Diagnostic Testing Results: I have personally reviewed pertinent reports         Lab Results   Component Value Date    WBC 6 06 10/16/2021    HGB 14 5 10/16/2021    HCT 44 7 10/16/2021    MCV 92 10/16/2021     10/16/2021     Lab Results   Component Value Date    K 4 1 10/16/2021     10/16/2021    CO2 26 10/16/2021    BUN 16 10/16/2021    CREATININE 0 76 10/16/2021    GLUF 107 (H) 10/16/2021    CALCIUM 9 6 10/16/2021    AST 13 10/16/2021    ALT 19 10/16/2021    ALKPHOS 92 10/16/2021    EGFR 86 10/16/2021     Lab Results   Component Value Date    CHOLESTEROL 212 (H) 10/16/2021     Lab Results   Component Value Date    HDL 73 10/16/2021     Lab Results   Component Value Date    LDLCALC 125 (H) 10/16/2021     Lab Results   Component Value Date    TRIG 68 10/16/2021     No results found for: [place], [place]  Lab Results   Component Value Date    UCS9FGHTOJLN 1 360 10/16/2021     No results found for: HGBA1C  No results found for: PSA    Radha Taylor, DO

## 2022-02-07 NOTE — ASSESSMENT & PLAN NOTE
Erysipelas type rash possibly of both lower extremities this type of rash difficult to diagnose but based on history I will start her on antibiotic and refer her over for dermatology evaluation

## 2022-02-10 ENCOUNTER — CONSULT (OUTPATIENT)
Dept: DERMATOLOGY | Facility: CLINIC | Age: 62
End: 2022-02-10
Payer: COMMERCIAL

## 2022-02-10 VITALS — BODY MASS INDEX: 19.63 KG/M2 | HEIGHT: 71 IN | TEMPERATURE: 97.8 F | WEIGHT: 140.2 LBS

## 2022-02-10 DIAGNOSIS — L30.0 NUMMULAR ECZEMA: Primary | ICD-10-CM

## 2022-02-10 DIAGNOSIS — A46 ERYSIPELAS OF BOTH LOWER EXTREMITIES: ICD-10-CM

## 2022-02-10 PROCEDURE — 3008F BODY MASS INDEX DOCD: CPT | Performed by: DERMATOLOGY

## 2022-02-10 PROCEDURE — 87205 SMEAR GRAM STAIN: CPT | Performed by: DERMATOLOGY

## 2022-02-10 PROCEDURE — 99203 OFFICE O/P NEW LOW 30 MIN: CPT | Performed by: DERMATOLOGY

## 2022-02-10 PROCEDURE — 87070 CULTURE OTHR SPECIMN AEROBIC: CPT | Performed by: DERMATOLOGY

## 2022-02-10 NOTE — PROGRESS NOTES
[facility] Note     Patient Name: Hema Casper  Encounter Date: 2/10/2022     Have you been cared for by a [facility] Dermatologist in the last 3 years and, if so, which one? No    · Have you traveled outside of the [address] [place] [address] in the past 3 months or outside of the [facility] area in the last 2 weeks? No     May we call your Preferred Phone number to discuss your specific medical information? Yes     May we leave a detailed message that includes your specific medical information? Yes      Today's Chief Concerns:   Concern #1:  Rash     Past Medical History:  Have you personally ever had or currently have any of the following? · Skin cancer (such as Melanoma, Basal Cell Carcinoma, Squamous Cell Carcinoma? (If Yes, please provide more detail)- No  · Eczema: No  · Psoriasis: No  · HIV/AIDS: No  · Hepatitis B or C: No  · Tuberculosis: No  · Systemic Immunosuppression such as Diabetes, Biologic or Immunotherapy, Chemotherapy, Organ Transplantation, Bone Marrow Transplantation (If YES, please provide more detail): No  · Radiation Treatment (If YES, please provide more detail): No  · Any other major medical conditions/concerns? 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 Walking, reading, sewing, gardening    Family History:  Have any of your "first degree relatives" (parent, brother, sister, or child) had any of the following       · Skin cancer such as Melanoma or Merkel Cell Carcinoma or Pancreatic Cancer? No  · Eczema, Asthma, Hay Fever or Seasonal Allergies: No  · Psoriasis or Psoriatic Arthritis: No  · Do any other medical conditions seem to run in your family? If Yes, what condition and which relatives?   No    Current Medications:     Current Outpatient Medications:     hydrocortisone 2 5 % cream, Apply topically 2 (two) times a day (Patient not taking: Reported on 8/26/2021), Disp: 30 g, Rfl: 1    meclizine (ANTIVERT) 12 5 MG tablet, Take 2 tablets (25 mg total) by mouth 3 (three) times a day as needed for dizziness (Patient not taking: Reported on 8/26/2021), Disp: 30 tablet, Rfl: 0    pantoprazole (PROTONIX) 40 mg tablet, Take 1 tablet (40 mg total) by mouth daily before breakfast (Patient not taking: Reported on 1/4/2022 ), Disp: 30 tablet, Rfl: 5      Review of Systems:  Have you recently had or currently have any of the following? If YES, what are you doing for the problem? · Fever, chills or unintended weight loss: No  · Sudden loss or change in your vision: No  · Nausea, vomiting or blood in your stool: No  · Painful or swollen joints: YES, wrist   · Wheezing or cough: No  · Changing mole or non-healing wound: YES, non-healing wound  · Nosebleeds: No  · Excessive sweating: No  · Easy or prolonged bleeding? No  · Over the last 2 weeks, how often have you been bothered by the following problems? · Taking little interest or pleasure in doing things: 1 - Not at All  · Feeling down, depressed, or hopeless: 1 - Not at All  · Rapid heartbeat with epinephrine:  YES    · FEMALES ONLY:    · Are you pregnant or planning to become pregnant? No  · Are you currently or planning to be nursing or breast feeding? No    · Any known allergies? Allergies   Allergen Reactions    Penicillins Anaphylaxis    Erythromycin Base Hives    Sulfa Antibiotics Hives         Physical Exam:     Was a chaperone (Derm Clinical Assistant) present throughout the entire Physical Exam? Yes     Did the Dermatology Team specifically  the patient on the importance of a Full Skin Exam to be sure that nothing is missed clinically?  Yes}  o Did the patient ultimately request or accept a Full Skin Exam?  NO  o Did the patient specifically refuse to have the areas "under-the-bra" examined by the Dermatologist? Dominik Ferguson  o Did the patient specifically refuse to have the areas "under-the-underwear" examined by the Dermatologist? YES    CONSTITUTIONAL:   Vitals:    02/10/22 1256   Temp: 97 8 °F (36 6 °C)   TempSrc: Temporal   Weight: 63 6 kg (140 lb 3 2 oz)   Height: 5' 11" (1 803 m)       PSYCH: Normal mood and affect  EYES: Normal conjunctiva  ENT: Normal lips and oral mucosa  CARDIOVASCULAR: No edema   RESPIRATORY: Normal respirations  HEME/LYMPH/IMMUNO:  No regional lymphadenopathy except as noted below in "ASSESSMENT AND PLAN BY DIAGNOSIS"    SKIN:  FULL ORGAN SYSTEM EXAM   Back/Spine Normal except as noted below in Assessment   Right Leg Normal except as noted below in Assessment   Left Leg Normal except as noted below in Assessment        Assessment and Plan by Diagnosis:    History of Present Condition:     Duration:  How long has this been an issue for you?    o  September 2021   Location Affected:  Where on the body is this affecting you?    o  bilateral legs   Quality:  Is there any bleeding, pain, itch, burning/irritation, or redness associated with the skin lesion? o  redness, scabbing, bleeding, itchy   Severity:  Describe any bleeding, pain, itch, burning/irritation, or redness on a scale of 1 to 10 (with 10 being the worst)  o  2   Timing:  Does this condition seem to be there pretty constantly or do you notice it more at specific times throughout the day? o  constant   Context:  Have you ever noticed that this condition seems to be associated with specific activities you do?    o  denies   Modifying Factors:    o Anything that seems to make the condition worse?    -  denies  o What have you tried to do to make the condition better?    -  calamine lotion, peroxide, neosporin, zinc oxide   Associated Signs and Symptoms:  Does this skin lesion seem to be associated with any of the following:  o  SL AMB DERM SIGNS AND SYMPTOMS: Redness, Itching and Scratching, Bleeding and Crusting       1   NUMMULAR ECZEMA    Physical Exam:   Anatomic Location Affected: Bilateral lower legs   Morphological Description:  Scaly pink plaques, yellow thick crust on right leg   Pertinent Positives:   Pertinent Negatives: Additional History of Present Condition:  See derm note above  Pt states she recalls trauma to the right lower leg, possible insect bite  The area started small and symptoms have spread  She now has a rash on both lower legs  She has seen her PCP for this issue  She was advised to try a Vaseline-like ointment  So patient tried topical zinc oxide instead  Assessment and Plan:  Based on a thorough discussion of this condition and the management approach to it (including a comprehensive discussion of the known risks, side effects and potential benefits of treatment), the patient (family) agrees to implement the following specific plan:   Wound culture taken today during visit   Advised to pursue topical treatment and wait for oral treatment until wound culture results return    Mix topicals 1/1: mupirocin ointment and triamcinolone ointment   Apply mixture topically twice daily   Avoid neosporin use    Numular eczema   Nummular dermatitis (or eczema) is also known as discoid eczema (or dermatitis)  It has two forms:   Exudative (wet) nummular dermatitis    Dry nummular dermatitis    Exudative nummular dermatitis  The exudative variant starts acutely and may persist for weeks, months and rarely years  Although it may arise at any age, most subjects are over 48 years  In children, it is often thought to be a type of atopic dermatitis, but in adults it doesn't appear to relate to atopy  It is more common in males than females  The initial plaque may appear at the site of trauma or infection   For example:   Thermal burn    Scabies infestation    Varicose vein surgery    Insect bite    Impetigo    Nummular dermatitis is sometimes due to drug allergy (e g  to interferon alpha, intravenous immunoglobulins, etanercept) or systematised contact allergy, especially to nickel, gold and mercury  The initial lesions are papules or vesicles, which form confluent plaques  The plaques may be crusted, weeping or blistered and are intensely itchy  Secondary infection with pustules, pain and spreading erythema is not uncommon  Older lesions may be dry and excoriated  Clusters of round or oval plaques may be localised to lower legs, the backs of the hands or other sites  Nummular dermatitis may also generalise to affect scalp, face, trunk and limbs  Generally the eruption is scattered, but sometimes the plaques are distributed symmetrically  In-between skin appears normal     Investigations  Skin swabs frequently culture abundant Staphylococcus aureus from exudative nummular dermatitis and sometimes from dry discoid dermatitis  Skin scrapings for microscopy and fungal culture may be necessary to rule out tinea corporis  Differential diagnosis  Discoid eczema is frequently confused with the following skin disorders:   Psoriasis (redder, scalier plaques, symmetrical distribution, typical psoriatic sites)    Tinea corporis (grouped lesions, scaly or pustular edge)    Contact dermatitis (irregular shaped and sized lesions, contact factors)    Lichen simplex (lichenified plaques, may co-exist with nummular dermatitis)    Stasis dermatitis (lower legs, circumferential, hyperpigmentation, lipodermatosclerosis)    Management  Management of nummular dermatitis may involve the following:     Skin emollients  Emollients include bath oils, soap substitutes and moisturizing creams  They can be applied to the dermatitis as frequently as required to relieve itching, scaling and dryness  Emollients should also be used on the unaffected skin to reduce dryness  It may be necessary to try several different products to find one that suits   Many people find one or more of the following helpful: glycerine and cetomacrogol cream, white soft paraffin/liquid paraffin mixed, fatty cream, wool fat lotions   Topical steroids   Topical steroids are anti-inflammatory creams or ointments available on prescription which may clear the dermatitis and reduce irritation  The stronger products are applied to the patches just once or twice daily for 2-4 weeks  They may be repeated from time to time depending on flare ups  Mild ones such as hydrocortisone are safe for daily use if necessary   Antibiotics   Antibiotics (most often flucloxacillin) are often prescribed if the rash is blistered, sticky or crusted  Sometimes nummular eczema clears completely on oral antibiotics, only to recur when they are discontinued   Oral antihistamines   Antihistamine pills may reduce the itching, and are particularly helpful at night-time  They do not clear the dermatitis  Non-sedating antihistamines appear less useful for nummular eczema than first-generation antihistamines taken at night to help sleep   Ultraviolet radiation (UV) treatment   Phototherapy several times weekly for 6-12 weeks can reduce the extent and severity of discoid eczema   Steroid injections  Intralesional steroids are sometimes injected into one or two particularly stubborn areas of discoid eczema  This treatment is unsuitable for multiple lesions   Oral steroids (very rarely)  Systemic steroids are reserved for severe and extensive cases of discoid eczema  They are usually prescribed for a few weeks while continuing steroid creams and emollients on residual dermatitis   Other oral treatments   Persistent and troublesome nummular eczema is occasionally treated with methotrexate, azathioprine or ciclosporin  These medicines have important risks and side effects and require careful monitoring by a specialist dermatologist  They may be more suitable in many cases than long-term steroids  Nummular eczema can usually be controlled with the above measures, although it has a tendency to recur when the treatment has been stopped   In most patients, nummular eczema eventually clears up completely  2  SEBORRHEIC KERATOSIS; NON-INFLAMED    Physical Exam:   Anatomic Location Affected:  Back    Morphological Description:  Flat and raised, waxy, smooth to warty textured, yellow to brownish-grey to dark brown to blackish, discrete, "stuck-on" appearing papule   Pertinent Positives:   Pertinent Negatives: Additional History of Present Condition:  Patient reports new bumps on the skin  Denies itch, burn, pain, bleeding or ulceration  Present constantly; nothing seems to make it worse or better  No prior treatment  Assessment and Plan:  Based on a thorough discussion of this condition and the management approach to it (including a comprehensive discussion of the known risks, side effects and potential benefits of treatment), the patient (family) agrees to implement the following specific plan:   Reassured, benign    Seborrheic Keratosis  A seborrheic keratosis is a harmless warty spot that appears during adult life as a common sign of skin aging  Seborrheic keratoses can arise on any area of skin, covered or uncovered, with the usual exception of the palms and soles  They do not arise from mucous membranes  Seborrheic keratoses can have highly variable appearance  Seborrheic keratoses are extremely common  It has been estimated that over 90% of adults over the age of 61 years have one or more of them  They occur in males and females of all races, typically beginning to erupt in the 35s or 45s  They are uncommon under the age of 21 years  The precise cause of seborrhoeic keratoses is not known  Seborrhoeic keratoses are considered degenerative in nature  As time goes by, seborrheic keratoses tend to become more numerous  Some people inherit a tendency to develop a very large number of them; some people may have hundreds of them      The name "seborrheic keratosis" is misleading, because these lesions are not limited to a seborrhoeic distribution (scalp, mid-face, chest, upper back), nor are they formed from sebaceous glands, nor are they associated with sebum -- which is greasy  Seborrheic keratosis may also be called "SK," "Seb K," "basal cell papilloma," "senile wart," or "barnacle "      Researchers have noted:   Eruptive seborrhoeic keratoses can follow sunburn or dermatitis   Skin friction may be the reason they appear in body folds   Viral cause (e g , human papillomavirus) seems unlikely   Stable and clonal mutations or activation of FRFR3, PIK3CA, AMY, AKT1 and EGFR genes are found in seborrhoeic keratoses   Seborrhoeic keratosis can arise from solar lentigo   FRFR3 mutations also arise in solar lentigines  These mutations are associated with increased age and location on the head and neck, suggesting a role of ultraviolet radiation in these lesions   Seborrheic keratoses do not harbour tumour suppressor gene mutations   Epidermal growth factor receptor inhibitors, which are used to treat some cancers, often result in an increase in verrucal (warty) keratoses  There is no easy way to remove multiple lesions on a single occasion  Unless a specific lesion is "inflamed" and is causing pain or stinging/burning or is bleeding, most insurance companies do not authorize treatment        Scribe Attestation    I,:  Chikis Ramirez am acting as a scribe while in the presence of the attending physician :       I,:  Lizz Ochoa MD personally performed the services described in this documentation    as scribed in my presence :

## 2022-02-10 NOTE — PATIENT INSTRUCTIONS
NUMMULAR ECZEMA    Assessment and Plan:  Based on a thorough discussion of this condition and the management approach to it (including a comprehensive discussion of the known risks, side effects and potential benefits of treatment), the patient (family) agrees to implement the following specific plan:   Wound culture taken today during visit   Advised to pursue topical treatment and wait for oral treatment until wound culture results return    Mix topicals 1/1: mupirocin ointment and triamcinolone ointment   Apply mixture topically twice daily   Avoid neosporin use    Numular eczema   Nummular dermatitis (or eczema) is also known as discoid eczema (or dermatitis)  It has two forms:   Exudative (wet) nummular dermatitis    Dry nummular dermatitis    Exudative nummular dermatitis  The exudative variant starts acutely and may persist for weeks, months and rarely years  Although it may arise at any age, most subjects are over 48 years  In children, it is often thought to be a type of atopic dermatitis, but in adults it doesn't appear to relate to atopy  It is more common in males than females  The initial plaque may appear at the site of trauma or infection  For example:   Thermal burn    Scabies infestation    Varicose vein surgery    Insect bite    Impetigo    Nummular dermatitis is sometimes due to drug allergy (e g  to interferon alpha, intravenous immunoglobulins, etanercept) or systematised contact allergy, especially to nickel, gold and mercury  The initial lesions are papules or vesicles, which form confluent plaques  The plaques may be crusted, weeping or blistered and are intensely itchy  Secondary infection with pustules, pain and spreading erythema is not uncommon  Older lesions may be dry and excoriated  Clusters of round or oval plaques may be localised to lower legs, the backs of the hands or other sites  Nummular dermatitis may also generalise to affect scalp, face, trunk and limbs  Generally the eruption is scattered, but sometimes the plaques are distributed symmetrically  In-between skin appears normal     Investigations  Skin swabs frequently culture abundant Staphylococcus aureus from exudative nummular dermatitis and sometimes from dry discoid dermatitis  Skin scrapings for microscopy and fungal culture may be necessary to rule out tinea corporis  Differential diagnosis  Discoid eczema is frequently confused with the following skin disorders:   Psoriasis (redder, scalier plaques, symmetrical distribution, typical psoriatic sites)    Tinea corporis (grouped lesions, scaly or pustular edge)    Contact dermatitis (irregular shaped and sized lesions, contact factors)    Lichen simplex (lichenified plaques, may co-exist with nummular dermatitis)    Stasis dermatitis (lower legs, circumferential, hyperpigmentation, lipodermatosclerosis)    Management  Management of nummular dermatitis may involve the following:     Skin emollients  Emollients include bath oils, soap substitutes and moisturizing creams  They can be applied to the dermatitis as frequently as required to relieve itching, scaling and dryness  Emollients should also be used on the unaffected skin to reduce dryness  It may be necessary to try several different products to find one that suits  Many people find one or more of the following helpful: glycerine and cetomacrogol cream, white soft paraffin/liquid paraffin mixed, fatty cream, wool fat lotions   Topical steroids   Topical steroids are anti-inflammatory creams or ointments available on prescription which may clear the dermatitis and reduce irritation  The stronger products are applied to the patches just once or twice daily for 2-4 weeks  They may be repeated from time to time depending on flare ups  Mild ones such as hydrocortisone are safe for daily use if necessary     Antibiotics   Antibiotics (most often flucloxacillin) are often prescribed if the rash is blistered, sticky or crusted  Sometimes nummular eczema clears completely on oral antibiotics, only to recur when they are discontinued   Oral antihistamines   Antihistamine pills may reduce the itching, and are particularly helpful at night-time  They do not clear the dermatitis  Non-sedating antihistamines appear less useful for nummular eczema than first-generation antihistamines taken at night to help sleep   Ultraviolet radiation (UV) treatment   Phototherapy several times weekly for 6-12 weeks can reduce the extent and severity of discoid eczema   Steroid injections  Intralesional steroids are sometimes injected into one or two particularly stubborn areas of discoid eczema  This treatment is unsuitable for multiple lesions   Oral steroids (very rarely)  Systemic steroids are reserved for severe and extensive cases of discoid eczema  They are usually prescribed for a few weeks while continuing steroid creams and emollients on residual dermatitis   Other oral treatments   Persistent and troublesome nummular eczema is occasionally treated with methotrexate, azathioprine or ciclosporin  These medicines have important risks and side effects and require careful monitoring by a specialist dermatologist  They may be more suitable in many cases than long-term steroids  Nummular eczema can usually be controlled with the above measures, although it has a tendency to recur when the treatment has been stopped  In most patients, nummular eczema eventually clears up completely      SEBORRHEIC KERATOSIS    Assessment and Plan:  Based on a thorough discussion of this condition and the management approach to it (including a comprehensive discussion of the known risks, side effects and potential benefits of treatment), the patient (family) agrees to implement the following specific plan:   Reassured, benign    Seborrheic Keratosis  A seborrheic keratosis is a harmless warty spot that appears during adult life as a common sign of skin aging  Seborrheic keratoses can arise on any area of skin, covered or uncovered, with the usual exception of the palms and soles  They do not arise from mucous membranes  Seborrheic keratoses can have highly variable appearance  Seborrheic keratoses are extremely common  It has been estimated that over 90% of adults over the age of 61 years have one or more of them  They occur in males and females of all races, typically beginning to erupt in the 35s or 45s  They are uncommon under the age of 21 years  The precise cause of seborrhoeic keratoses is not known  Seborrhoeic keratoses are considered degenerative in nature  As time goes by, seborrheic keratoses tend to become more numerous  Some people inherit a tendency to develop a very large number of them; some people may have hundreds of them  The name "seborrheic keratosis" is misleading, because these lesions are not limited to a seborrhoeic distribution (scalp, mid-face, chest, upper back), nor are they formed from sebaceous glands, nor are they associated with sebum -- which is greasy  Seborrheic keratosis may also be called "SK," "Seb K," "basal cell papilloma," "senile wart," or "barnacle "      Researchers have noted:   Eruptive seborrhoeic keratoses can follow sunburn or dermatitis   Skin friction may be the reason they appear in body folds   Viral cause (e g , human papillomavirus) seems unlikely   Stable and clonal mutations or activation of FRFR3, PIK3CA, AMY, AKT1 and EGFR genes are found in seborrhoeic keratoses   Seborrhoeic keratosis can arise from solar lentigo   FRFR3 mutations also arise in solar lentigines   These mutations are associated with increased age and location on the head and neck, suggesting a role of ultraviolet radiation in these lesions   Seborrheic keratoses do not harbour tumour suppressor gene mutations   Epidermal growth factor receptor inhibitors, which are used to treat some cancers, often result in an increase in verrucal (warty) keratoses  There is no easy way to remove multiple lesions on a single occasion  Unless a specific lesion is "inflamed" and is causing pain or stinging/burning or is bleeding, most insurance companies do not authorize treatment

## 2022-02-11 DIAGNOSIS — H83.01 LABYRINTHITIS OF RIGHT EAR: Primary | ICD-10-CM

## 2022-02-11 DIAGNOSIS — G90.50 RSD (REFLEX SYMPATHETIC DYSTROPHY): ICD-10-CM

## 2022-02-11 DIAGNOSIS — F41.1 GENERALIZED ANXIETY DISORDER: ICD-10-CM

## 2022-02-11 RX ORDER — ALPRAZOLAM 0.25 MG/1
0.25 TABLET ORAL 2 TIMES DAILY PRN
Qty: 20 TABLET | Refills: 0 | Status: SHIPPED | OUTPATIENT
Start: 2022-02-11

## 2022-02-13 LAB
BACTERIA WND AEROBE CULT: ABNORMAL
GRAM STN SPEC: ABNORMAL

## 2022-05-26 ENCOUNTER — TELEPHONE (OUTPATIENT)
Dept: FAMILY MEDICINE CLINIC | Facility: CLINIC | Age: 62
End: 2022-05-26

## 2022-05-26 NOTE — TELEPHONE ENCOUNTER
Pt called asking if you can place a Xray of her right ankle   She statsed her ankle gave out on her and when she fall it twisted up on her

## 2022-05-27 DIAGNOSIS — M25.571 ACUTE RIGHT ANKLE PAIN: Primary | ICD-10-CM

## 2022-05-27 NOTE — TELEPHONE ENCOUNTER
Called and spoke with patient and let her know ordered was placed  She said she is not going  Bruising is still present but swelling has gone down  She refuses to go to any medical facility that does not wear a mask  Let patient know there is multiple facilities that she can go and the script is in her chart if she changes her mind  Patient acknowledged and agreed

## 2023-04-28 ENCOUNTER — OFFICE VISIT (OUTPATIENT)
Dept: URGENT CARE | Facility: CLINIC | Age: 63
End: 2023-04-28

## 2023-04-28 VITALS
HEART RATE: 70 BPM | DIASTOLIC BLOOD PRESSURE: 59 MMHG | TEMPERATURE: 98.1 F | RESPIRATION RATE: 18 BRPM | SYSTOLIC BLOOD PRESSURE: 122 MMHG | OXYGEN SATURATION: 99 %

## 2023-04-28 DIAGNOSIS — R05.1 ACUTE COUGH: Primary | ICD-10-CM

## 2023-04-28 DIAGNOSIS — J06.9 ACUTE URI: ICD-10-CM

## 2023-04-28 LAB
SARS-COV-2 AG UPPER RESP QL IA: NEGATIVE
VALID CONTROL: NORMAL

## 2023-04-28 NOTE — PROGRESS NOTES
[facility]        NAME: Anthony Peace is a 59 y o  female  : 1960    MRN: [id number]  DATE: 2023  TIME: 1:13 PM    Assessment and Plan   Acute cough [R05 1]  1  Acute cough  Poct Covid 19 Rapid Antigen Test      2  Acute URI              Patient Instructions   Negative rapid covid in clinic   On exam overall well-appearing, non toxic afebrile  Congested but lungs CTA and no increased work of breathing  Continue supportive treatment :Vitamin D3 2000 IU daily  Vitamin C 1000mg twice per day  Multivitamin daily  Some studies suggest that Zinc 12 5-15mg every 2 hours while awake x 5 days may shorten the duration cold symptoms by 1-2 days  Increase fluids and rest   Nasal saline spray; Afrin if severe congestion (do not use for more than 3 days)  Over the counter decongestant/cough suppressants  Tylenol/Ibuprofen for pain/fever  Salt water gargles and chloraseptic spray  Throat Coat Tea  Warm compresses over sinuses  Cool mist humidifier or vicks vaporizer  Follow up with PCP if symptoms do not improve or worsen    Follow up with PCP in 3-5 days  Proceed to  ER if symptoms worsen  Chief Complaint     Chief Complaint   Patient presents with   • Cough     Pt c/o cough and congestion that started on Tuesday  Pt states had a temp of 101 on Wednesday but none since         History of Present Illness       HPI  This is a 59year old female her c/o nasl congestion and cough for the last 3-4 days  Notes fever on Tuesday of 101 but no temp since than  Denies ear pain, sore throat, n/v/d, shortness of breath or chest pain  Review of Systems   Review of Systems   Constitutional: Negative for fever  HENT: Positive for congestion  Negative for ear pain and sore throat  Respiratory: Positive for cough  Negative for shortness of breath  Cardiovascular: Negative for chest pain  Gastrointestinal: Negative for diarrhea, nausea and vomiting           Current Medications       Current Outpatient Medications:   •  ALPRAZolam (XANAX) 0 25 mg tablet, Take 1 tablet (0 25 mg total) by mouth 2 (two) times a day as needed for anxiety, Disp: 20 tablet, Rfl: 0  •  hydrocortisone 2 5 % cream, Apply topically 2 (two) times a day (Patient not taking: Reported on 8/26/2021), Disp: 30 g, Rfl: 1  •  meclizine (ANTIVERT) 12 5 MG tablet, Take 2 tablets (25 mg total) by mouth 3 (three) times a day as needed for dizziness (Patient not taking: Reported on 8/26/2021), Disp: 30 tablet, Rfl: 0  •  mupirocin (BACTROBAN) 2 % ointment, Apply topically 2 (two) times a day, Disp: 22 g, Rfl: 0  •  pantoprazole (PROTONIX) 40 mg tablet, Take 1 tablet (40 mg total) by mouth daily before breakfast (Patient not taking: Reported on 1/4/2022 ), Disp: 30 tablet, Rfl: 5  •  triamcinolone (KENALOG) 0 1 % ointment, Apply topically 2 (two) times a day, Disp: 80 g, Rfl: 0    Current Allergies     Allergies as of 04/28/2023 - Reviewed 04/28/2023   Allergen Reaction Noted   • Penicillins Anaphylaxis    • Erythromycin base Hives    • Sulfa antibiotics Hives             The following portions of the patient's history were reviewed and updated as appropriate: allergies, current medications, past family history, past medical history, past social history, past surgical history and problem list      Past Medical History:   Diagnosis Date   • Gilbert's disease    • Mono exposure    • RSD (reflex sympathetic dystrophy)        Past Surgical History:   Procedure Laterality Date   • WRIST SURGERY         Family History   Problem Relation Age of Onset   • Ulcerative colitis Mother    • Heart attack Father          Medications have been verified  Objective   /59   Pulse 70   Temp 98 1 °F (36 7 °C) (Temporal)   Resp 18   SpO2 99%        Physical Exam     Physical Exam  Vitals and nursing note reviewed  Constitutional:       General: She is not in acute distress  Appearance: Normal appearance  She is normal weight   She is not ill-appearing or toxic-appearing  HENT:      Right Ear: Tympanic membrane, ear canal and external ear normal       Left Ear: Tympanic membrane, ear canal and external ear normal       Nose: Congestion present  Mouth/Throat:      Mouth: Mucous membranes are moist       Pharynx: No oropharyngeal exudate or posterior oropharyngeal erythema  Cardiovascular:      Rate and Rhythm: Normal rate and regular rhythm  Pulmonary:      Effort: Pulmonary effort is normal       Breath sounds: Normal breath sounds  Neurological:      Mental Status: She is alert

## 2023-05-02 ENCOUNTER — OFFICE VISIT (OUTPATIENT)
Dept: FAMILY MEDICINE CLINIC | Facility: CLINIC | Age: 63
End: 2023-05-02

## 2023-05-02 VITALS
TEMPERATURE: 98.7 F | WEIGHT: 144 LBS | SYSTOLIC BLOOD PRESSURE: 122 MMHG | DIASTOLIC BLOOD PRESSURE: 62 MMHG | HEART RATE: 104 BPM | BODY MASS INDEX: 20.16 KG/M2 | HEIGHT: 71 IN | OXYGEN SATURATION: 95 %

## 2023-05-02 DIAGNOSIS — B96.89 ACUTE BACTERIAL BRONCHITIS: ICD-10-CM

## 2023-05-02 DIAGNOSIS — B96.89 ACUTE BACTERIAL BRONCHITIS: Primary | ICD-10-CM

## 2023-05-02 DIAGNOSIS — J20.8 ACUTE BACTERIAL BRONCHITIS: ICD-10-CM

## 2023-05-02 DIAGNOSIS — J20.8 ACUTE BACTERIAL BRONCHITIS: Primary | ICD-10-CM

## 2023-05-02 RX ORDER — CIPROFLOXACIN 500 MG/1
500 TABLET, FILM COATED ORAL EVERY 12 HOURS SCHEDULED
Qty: 20 TABLET | Refills: 0 | Status: SHIPPED | OUTPATIENT
Start: 2023-05-02 | End: 2023-05-12

## 2023-05-02 RX ORDER — CIPROFLOXACIN 500 MG/1
500 TABLET, FILM COATED ORAL EVERY 12 HOURS SCHEDULED
Qty: 20 TABLET | Refills: 0 | Status: SHIPPED | OUTPATIENT
Start: 2023-05-02 | End: 2023-05-02 | Stop reason: SDUPTHER

## 2023-05-02 RX ORDER — ALBUTEROL SULFATE 90 UG/1
2 AEROSOL, METERED RESPIRATORY (INHALATION) EVERY 6 HOURS PRN
Qty: 18 G | Refills: 5 | Status: SHIPPED | OUTPATIENT
Start: 2023-05-02

## 2023-05-02 NOTE — PROGRESS NOTES
Assessment/Plan:       Problem List Items Addressed This Visit        Respiratory    Acute bacterial bronchitis - Primary     4 days ago seen at Urgent Care Covid tested negative and did okay after the last few days however awakening this morning she has a productive cough of yellow-green sputum and worsening cough shortness of breath concerned about bacterial bronchitis she has had this in her history years ago  Her  recently has been diagnosed with a respiratory illness as well and has been on treatment for this  Took plain Guiafenacin today  Recommend continuation of the guaifenesin as a mucolytic to help as an expectorant additionally I will provide her with antibiotics and an inhaler to use as she has significant wheezing and a productive cough         Relevant Medications    ciprofloxacin (CIPRO) 500 mg tablet    mometasone-formoterol (DULERA) 100-5 MCG/ACT inhaler         Subjective:      Patient ID: Opal Jovel is a 59 y o  female  Patient presents for productive cough worsening over the last 4 days with wheezing and shortness of breath now not responding to over-the-counter guaifenesin      The following portions of the patient's history were reviewed and updated as appropriate: allergies, current medications, past family history, past medical history, past social history, past surgical history and problem list     Review of Systems   Constitutional: Negative for chills, fatigue and fever  HENT: Negative for congestion, nosebleeds, rhinorrhea, sinus pressure and sore throat  Eyes: Negative for discharge and redness  Respiratory: Positive for cough, shortness of breath and wheezing  Cardiovascular: Negative for chest pain, palpitations and leg swelling  Gastrointestinal: Negative for abdominal pain, blood in stool and nausea  Endocrine: Negative for cold intolerance, heat intolerance and polyuria  Genitourinary: Negative for dysuria and frequency     Musculoskeletal: "Negative for arthralgias, back pain and myalgias  Skin: Negative for rash  Neurological: Negative for dizziness, weakness and headaches  Hematological: Negative for adenopathy  Psychiatric/Behavioral: Negative for behavioral problems and sleep disturbance  The patient is not nervous/anxious  Objective:      /62   Pulse 104   Temp 98 7 °F (37 1 °C)   Ht 5' 11\" (1 803 m)   Wt 65 3 kg (144 lb)   SpO2 95%   BMI 20 08 kg/m²        Physical Exam  Vitals and nursing note reviewed  Constitutional:       General: She is not in acute distress  Appearance: Normal appearance  She is well-developed and normal weight  HENT:      Head: Normocephalic and atraumatic  Right Ear: Tympanic membrane, ear canal and external ear normal       Left Ear: Tympanic membrane, ear canal and external ear normal       Nose: Nose normal       Mouth/Throat:      Mouth: Mucous membranes are moist       Pharynx: Oropharynx is clear  No oropharyngeal exudate  Eyes:      General: No scleral icterus  Right eye: No discharge  Left eye: No discharge  Extraocular Movements: Extraocular movements intact  Conjunctiva/sclera: Conjunctivae normal       Pupils: Pupils are equal, round, and reactive to light  Neck:      Thyroid: No thyromegaly  Vascular: No JVD  Cardiovascular:      Rate and Rhythm: Normal rate and regular rhythm  Pulses: Normal pulses  Heart sounds: Normal heart sounds  No murmur heard  Pulmonary:      Effort: Pulmonary effort is normal       Breath sounds: No wheezing or rales  Chest:      Chest wall: No tenderness  Abdominal:      General: Bowel sounds are normal  There is no distension  Palpations: Abdomen is soft  There is no mass  Tenderness: There is no abdominal tenderness  Musculoskeletal:         General: No tenderness or deformity  Normal range of motion  Cervical back: Normal range of motion     Lymphadenopathy:      Cervical: " No cervical adenopathy  Skin:     General: Skin is warm and dry  Findings: No rash  Neurological:      General: No focal deficit present  Mental Status: She is alert and oriented to person, place, and time  Cranial Nerves: No cranial nerve deficit  Coordination: Coordination normal       Deep Tendon Reflexes: Reflexes are normal and symmetric  Reflexes normal    Psychiatric:         Mood and Affect: Mood normal          Behavior: Behavior normal          Thought Content: Thought content normal          Judgment: Judgment normal           Data:    Laboratory Results: I have personally reviewed the pertinent laboratory results/reports   Radiology/Other Diagnostic Testing Results: I have personally reviewed pertinent reports         Lab Results   Component Value Date    WBC 6 06 10/16/2021    HGB 14 5 10/16/2021    HCT 44 7 10/16/2021    MCV 92 10/16/2021     10/16/2021     Lab Results   Component Value Date    K 4 1 10/16/2021     10/16/2021    CO2 26 10/16/2021    BUN 16 10/16/2021    CREATININE 0 76 10/16/2021    GLUF 107 (H) 10/16/2021    CALCIUM 9 6 10/16/2021    AST 13 10/16/2021    ALT 19 10/16/2021    ALKPHOS 92 10/16/2021    EGFR 86 10/16/2021     Lab Results   Component Value Date    CHOLESTEROL 212 (H) 10/16/2021     Lab Results   Component Value Date    HDL 73 10/16/2021     Lab Results   Component Value Date    LDLCALC 125 (H) 10/16/2021     Lab Results   Component Value Date    TRIG 68 10/16/2021     No results found for: [place], [place]  Lab Results   Component Value Date    ZOP0TCSZBJMG 1 360 10/16/2021     No results found for: HGBA1C  No results found for: PSA    [facility] GABY'Roeyr, DO

## 2023-05-02 NOTE — ASSESSMENT & PLAN NOTE
4 days ago seen at Urgent Care Covid tested negative and did okay after the last few days however awakening this morning she has a productive cough of yellow-green sputum and worsening cough shortness of breath concerned about bacterial bronchitis she has had this in her history years ago  Her  recently has been diagnosed with a respiratory illness as well and has been on treatment for this  Took plain Guiafenacin today    Recommend continuation of the guaifenesin as a mucolytic to help as an expectorant additionally I will provide her with antibiotics and an inhaler to use as she has significant wheezing and a productive cough

## 2023-07-01 PROBLEM — B96.89 ACUTE BACTERIAL BRONCHITIS: Status: RESOLVED | Noted: 2023-05-02 | Resolved: 2023-07-01

## 2023-07-01 PROBLEM — J20.8 ACUTE BACTERIAL BRONCHITIS: Status: RESOLVED | Noted: 2023-05-02 | Resolved: 2023-07-01

## 2023-10-18 ENCOUNTER — OFFICE VISIT (OUTPATIENT)
Dept: FAMILY MEDICINE CLINIC | Facility: CLINIC | Age: 63
End: 2023-10-18
Payer: COMMERCIAL

## 2023-10-18 VITALS — TEMPERATURE: 97.5 F | OXYGEN SATURATION: 97 % | HEART RATE: 88 BPM | RESPIRATION RATE: 18 BRPM

## 2023-10-18 DIAGNOSIS — R11.0 NAUSEA: Primary | ICD-10-CM

## 2023-10-18 DIAGNOSIS — B34.9 VIRAL SYNDROME: ICD-10-CM

## 2023-10-18 LAB
SARS-COV-2 AG UPPER RESP QL IA: NEGATIVE
VALID CONTROL: NORMAL

## 2023-10-18 PROCEDURE — 99213 OFFICE O/P EST LOW 20 MIN: CPT | Performed by: NURSE PRACTITIONER

## 2023-10-18 PROCEDURE — 87811 SARS-COV-2 COVID19 W/OPTIC: CPT | Performed by: NURSE PRACTITIONER

## 2023-10-18 NOTE — ASSESSMENT & PLAN NOTE
Viral symptoms.  positive for COVID. She did test negative today for COVID. She will continue to monitor her symptoms. She does have a home test at the house that she will utilize if symptoms worsen or she may come to the office for an additional test.  She will isolate from  at this time during his acute phase. We did discuss the probability of her having COVID although her rapid COVID test was negative.   She was also encouraged to start on vitamin C D and zinc.

## 2023-10-18 NOTE — PROGRESS NOTES
OFFICE VISIT  Edwin Garrido 59 y.o. female MRN: [id number]          Assessment / Plan:  Problem List Items Addressed This Visit          Other    Nausea - Primary     Viral symptoms.  positive for COVID. She did test negative today for COVID. She will continue to monitor her symptoms. She does have a home test at the house that she will utilize if symptoms worsen or she may come to the office for an additional test.  She will isolate from  at this time during his acute phase. We did discuss the probability of her having COVID although her rapid COVID test was negative. She was also encouraged to start on vitamin C D and zinc.         Relevant Orders    POCT Rapid Covid Ag (Completed)    Viral syndrome         Reason For Visit / Chief Complaint  Chief Complaint   Patient presents with    Nausea    Fatigue    Generalized Body Aches     Started this morning        HPI:  Brandon Fulton is a 59 y.o. female who presents today for fatigue, body aches and nausea. Symptoms started today. She resides with her  who is recently positive for COVID. She does report avoiding him over the last 2 days due to his acute illness, but was unaware COVID.     Historical Information   Past Medical History:   Diagnosis Date    Gilbert's disease     Mono exposure     RSD (reflex sympathetic dystrophy)      Past Surgical History:   Procedure Laterality Date    WRIST SURGERY       Social History   Social History     Substance and Sexual Activity   Alcohol Use Not Currently     Social History     Substance and Sexual Activity   Drug Use Yes     Social History     Tobacco Use   Smoking Status Every Day    Packs/day: 0.50    Types: Cigarettes   Smokeless Tobacco Never     Family History   Problem Relation Age of Onset    Ulcerative colitis Mother     Heart attack Father        Meds/Allergies   Allergies   Allergen Reactions    Penicillins Anaphylaxis    Erythromycin Base Hives    Sulfa Antibiotics Hives Meds:    Current Outpatient Medications:     albuterol (Ventolin HFA) 90 mcg/act inhaler, Inhale 2 puffs every 6 (six) hours as needed for wheezing, Disp: 18 g, Rfl: 5    mometasone-formoterol (DULERA) 100-5 MCG/ACT inhaler, Inhale 2 puffs 2 (two) times a day Rinse mouth after use., Disp: 13 g, Rfl: 5      REVIEW OF SYSTEMS  Review of Systems   Constitutional:  Positive for activity change, chills and fatigue. Negative for fever. HENT:  Negative for congestion, ear discharge, ear pain, sinus pressure, sinus pain, sore throat, tinnitus and trouble swallowing. Eyes:  Negative for photophobia, pain, discharge, itching and visual disturbance. Respiratory:  Negative for cough, chest tightness, shortness of breath and wheezing. Cardiovascular:  Negative for chest pain and leg swelling. Gastrointestinal:  Positive for nausea. Negative for abdominal distention, abdominal pain, constipation, diarrhea and vomiting. Endocrine: Negative for polydipsia, polyphagia and polyuria. Genitourinary:  Negative for dysuria and frequency. Musculoskeletal:  Negative for arthralgias, myalgias, neck pain and neck stiffness. Skin:  Negative for color change. Neurological:  Negative for dizziness, syncope, weakness, numbness and headaches. Hematological:  Does not bruise/bleed easily. Psychiatric/Behavioral:  Negative for behavioral problems, confusion, self-injury, sleep disturbance and suicidal ideas. The patient is not nervous/anxious. Current Vitals:   Pulse: 88 (10/18/23 0948)  Temperature: 97.5 °F (36.4 °C) (10/18/23 0948)  Temp Source: Tympanic (10/18/23 0948)  Respirations: 18 (10/18/23 0948)  SpO2: 97 % (10/18/23 0948)  [unfilled]    PHYSICAL EXAMS:  Physical Exam  Constitutional:       Appearance: Normal appearance. She is not ill-appearing. HENT:      Head: Normocephalic and atraumatic.    Pulmonary:      Effort: Pulmonary effort is normal.   Neurological:      General: No focal deficit present. Mental Status: She is alert and oriented to person, place, and time. Psychiatric:         Mood and Affect: Mood normal.         Behavior: Behavior normal.         Thought Content: Thought content normal.         Judgment: Judgment normal.             Lab, imaging and other studies: I have personally reviewed pertinent reports. Frances Latif

## 2023-10-20 ENCOUNTER — TELEMEDICINE (OUTPATIENT)
Dept: FAMILY MEDICINE CLINIC | Facility: CLINIC | Age: 63
End: 2023-10-20

## 2023-10-20 DIAGNOSIS — U07.1 COVID-19 VIRUS INFECTION: Primary | ICD-10-CM

## 2023-10-20 RX ORDER — ONDANSETRON 4 MG/1
4 TABLET, FILM COATED ORAL EVERY 8 HOURS PRN
Qty: 12 TABLET | Refills: 0 | Status: SHIPPED | OUTPATIENT
Start: 2023-10-20 | End: 2023-10-24

## 2023-10-20 NOTE — PROGRESS NOTES
COVID-19 Outpatient Progress Note    Assessment/Plan:    Problem List Items Addressed This Visit          Other    COVID-19 virus infection - Primary     COVID-19 Home Care Guidelines    Your healthcare provider and/or public health staff have evaluated you and have determined that you do not need to remain in the hospital at this time. At this time you can be isolated at home where you will be monitored by staff from your local or state health department. You should carefully follow the prevention and isolation steps below until a healthcare provider or local or state health department says that you can return to your normal activities. Stay home except to get medical care    People who are mildly ill with COVID-19 are able to isolate at home during their illness. You should restrict activities outside your home, except for getting medical care. Do not go to work, school, or public areas. Avoid using public transportation, ride-sharing, or taxis. Separate yourself from other people and animals in your home    People: As much as possible, you should stay in a specific room and away from other people in your home. Also, you should use a separate bathroom, if available. Animals: You should restrict contact with pets and other animals while you are sick with COVID-19, just like you would around other people. Although there have not been reports of pets or other animals becoming sick with COVID-19, it is still recommended that people sick with COVID-19 limit contact with animals until more information is known about the virus. When possible, have another member of your household care for your animals while you are sick. If you are sick with COVID-19, avoid contact with your pet, including petting, snuggling, being kissed or licked, and sharing food. If you must care for your pet or be around animals while you are sick, wash your hands before and after you interact with pets and wear a facemask.  See [id number] and Animals for more information. Call ahead before visiting your doctor    If you have a medical appointment, call the healthcare provider and tell them that you have or may have COVID-19. This will help the healthcare provider’s office take steps to keep other people from getting infected or exposed. Wear a facemask    You should wear a facemask when you are around other people (e.g., sharing a room or vehicle) or pets and before you enter a healthcare provider’s office. If you are not able to wear a facemask (for example, because it causes trouble breathing), then people who live with you should not stay in the same room with you, or they should wear a facemask if they enter your room. Cover your coughs and sneezes    Cover your mouth and nose with a tissue when you cough or sneeze. Throw used tissues in a lined trash can. Immediately wash your hands with soap and water for at least 20 seconds or, if soap and water are not available, clean your hands with an alcohol-based hand  that contains at least 60% alcohol. Clean your hands often    Wash your hands often with soap and water for at least 20 seconds, especially after blowing your nose, coughing, or sneezing; going to the bathroom; and before eating or preparing food. If soap and water are not readily available, use an alcohol-based hand  with at least 60% alcohol, covering all surfaces of your hands and rubbing them together until they feel dry. Soap and water are the best option if hands are visibly dirty. Avoid touching your eyes, nose, and mouth with unwashed hands. Avoid sharing personal household items    You should not share dishes, drinking glasses, cups, eating utensils, towels, or bedding with other people or pets in your home. After using these items, they should be washed thoroughly with soap and water.     Clean all “high-touch” surfaces everyday    High touch surfaces include counters, tabletops, doorknobs, bathroom fixtures, toilets, phones, keyboards, tablets, and bedside tables. Also, clean any surfaces that may have blood, stool, or body fluids on them. Use a household cleaning spray or wipe, according to the label instructions. Labels contain instructions for safe and effective use of the cleaning product including precautions you should take when applying the product, such as wearing gloves and making sure you have good ventilation during use of the product. Monitor your symptoms    Seek prompt medical attention if your illness is worsening (e.g., difficulty breathing). Before seeking care, call your healthcare provider and tell them that you have, or are being evaluated for, COVID-19. Put on a facemask before you enter the facility. These steps will help the healthcare provider’s office to keep other people in the office or waiting room from getting infected or exposed. Ask your healthcare provider to call the local or state health department. Persons who are placed under active monitoring or facilitated self-monitoring should follow instructions provided by their local health department or occupational health professionals, as appropriate. If you have a medical emergency and need to call 911, notify the dispatch personnel that you have, or are being evaluated for COVID-19. If possible, put on a facemask before emergency medical services arrive. Discontinuing home isolation    Patients with confirmed COVID-19 should remain under home isolation precautions until the following conditions are met:    They have had no fever for at least 24 hours (that is one full day of no fever without the use medicine that reduces fevers)  AND  other symptoms have improved (for example, when their cough or shortness of breath have improved)  AND  If had mild or moderate illness, at least 10 days have passed since their symptoms first appeared or if severe illness (needed oxygen) or immunosuppressed, at least 20 days have passed since symptoms first appeared  Patients with confirmed COVID-19 should also notify close contacts (including their workplace) and ask that they self-quarantine. Currently, close contact is defined as being within 6 feet for 15 minutes or more from the period 24 hours starting 48 hours before symptom onset to the time at which the patient went into isolation. Close contacts of patients diagnosed with COVID-19 should be instructed by the patient to self-quarantine for 14 days from the last time of their last contact with the patient. Source: [employer]     She will start on vitamin C D and zinc.  We discussed Paxlovid, she has declined. Sending in Zofran. Relevant Medications    ondansetron (ZOFRAN) 4 mg tablet      Disposition:     Discussed symptom directed medication options with patient. Discussed vitamin D, vitamin C, and/or zinc supplementation with patient. I have spent a total time of 15 minutes on the day of the encounter for this patient including risks and benefits of treatment options and importance of treatment compliance. Encounter provider: Owen Boo     Provider located at: [address] [place] [id number] [phone]     Recent Visits  Date Type Provider Dept   10/18/23 Office Visit [facility]  recent visits within past 7 days and meeting all other requirements  Today's Visits  Date Type Provider Dept   10/20/23 Telemedicine J[facility] [address] today's visits and meeting all other requirements  Future Appointments  No visits were found meeting these conditions.   Showing future appointments within next 150 days and meeting all other requirements     This virtual check-in was done via [employer] and patient was informed that this is a secure, HIPAA-compliant platform. She agrees to proceed. Patient agrees to participate in a virtual check in via telephone or video visit instead of presenting to the office to address urgent/immediate medical needs. Patient is aware this is a billable service. She acknowledged consent and understanding of privacy and security of the video platform. The patient has agreed to participate and understands they can discontinue the visit at any time. After connecting through [facility], the patient was identified by name and date of birth. Shea Bains was informed that this was a telemedicine visit and that the exam was being conducted confidentially over secure lines. My office door was closed. No one else was in the room. Shea Bains acknowledged consent and understanding of privacy and security of the telemedicine visit. I informed the patient that I have reviewed her record in Epic and presented the opportunity for her to ask any questions regarding the visit today. The patient agreed to participate. Verification of patient location:  Patient is located in the following state in which I hold an active license: [place]    Subjective:   Shea Bains is a 59 y.o. female who is concerned about COVID-19. Patient's symptoms include chills, fatigue, nausea and myalgias. Patient denies congestion, rhinorrhea, sore throat, cough and vomiting.     - Date of symptom onset: 10/18/2023  - Date of exposure: 10/17/2023      COVID-19 vaccination status: Not vaccinated    Exposure:   Contact with a person who is under investigation (PUI) for or who is positive for COVID-19 within the last 14 days?: Yes    Patient was seen on Wednesday for a car visit. She is residing with her  who is currently positive for COVID. She initially tested negative, today she took a home test and is now positive. I was able to visualize a home test, confirming a positive read. she complains of nausea fatigue muscle aches.     Lab Results Component Value Date    SARSCOV2 Negative 10/01/2021    SARSCOVAG Negative 10/18/2023       Review of Systems   Constitutional:  Positive for activity change, chills and fatigue. HENT:  Negative for congestion, rhinorrhea, sinus pressure, sinus pain, sneezing and sore throat. Respiratory:  Negative for cough. Gastrointestinal:  Positive for nausea. Negative for vomiting. Musculoskeletal:  Positive for myalgias. Current Outpatient Medications on File Prior to Visit   Medication Sig    albuterol (Ventolin HFA) 90 mcg/act inhaler Inhale 2 puffs every 6 (six) hours as needed for wheezing    mometasone-formoterol (DULERA) 100-5 MCG/ACT inhaler Inhale 2 puffs 2 (two) times a day Rinse mouth after use. Objective: There were no vitals taken for this visit. Physical Exam  Constitutional:       Appearance: She is ill-appearing. HENT:      Head: Normocephalic and atraumatic. Pulmonary:      Effort: Pulmonary effort is normal.   Neurological:      General: No focal deficit present. Mental Status: She is alert and oriented to person, place, and time. Psychiatric:         Mood and Affect: Mood normal.         Behavior: Behavior normal.         Thought Content:  Thought content normal.         Judgment: Judgment normal.       JAMIE Rod

## 2023-10-20 NOTE — ASSESSMENT & PLAN NOTE
[address] [place] [address]    Your healthcare provider and/or public health staff have evaluated you and have determined that you do not need to remain in the hospital at this time. At this time you can be isolated at home where you will be monitored by staff from your local or state health department. You should carefully follow the prevention and isolation steps below until a healthcare provider or local or state health department says that you can return to your normal activities. Stay home except to get medical care    People who are mildly ill with COVID-19 are able to isolate at home during their illness. You should restrict activities outside your home, except for getting medical care. Do not go to work, school, or public areas. Avoid using public transportation, ride-sharing, or taxis. Separate yourself from other people and animals in your home    People: As much as possible, you should stay in a specific room and away from other people in your home. Also, you should use a separate bathroom, if available. Animals: You should restrict contact with pets and other animals while you are sick with COVID-19, just like you would around other people. Although there have not been reports of pets or other animals becoming sick with COVID-19, it is still recommended that people sick with COVID-19 limit contact with animals until more information is known about the virus. When possible, have another member of your household care for your animals while you are sick. If you are sick with COVID-19, avoid contact with your pet, including petting, snuggling, being kissed or licked, and sharing food. If you must care for your pet or be around animals while you are sick, wash your hands before and after you interact with pets and wear a facemask. See COVID-19 and Animals for more information.     Call ahead before visiting your doctor    If you have a medical appointment, call the healthcare provider and tell them that you have or may have COVID-19. This will help the healthcare provider’s office take steps to keep other people from getting infected or exposed. Wear a facemask    You should wear a facemask when you are around other people (e.g., sharing a room or vehicle) or pets and before you enter a healthcare provider’s office. If you are not able to wear a facemask (for example, because it causes trouble breathing), then people who live with you should not stay in the same room with you, or they should wear a facemask if they enter your room. Cover your coughs and sneezes    Cover your mouth and nose with a tissue when you cough or sneeze. Throw used tissues in a lined trash can. Immediately wash your hands with soap and water for at least 20 seconds or, if soap and water are not available, clean your hands with an alcohol-based hand  that contains at least 60% alcohol. Clean your hands often    Wash your hands often with soap and water for at least 20 seconds, especially after blowing your nose, coughing, or sneezing; going to the bathroom; and before eating or preparing food. If soap and water are not readily available, use an alcohol-based hand  with at least 60% alcohol, covering all surfaces of your hands and rubbing them together until they feel dry. Soap and water are the best option if hands are visibly dirty. Avoid touching your eyes, nose, and mouth with unwashed hands. Avoid sharing personal household items    You should not share dishes, drinking glasses, cups, eating utensils, towels, or bedding with other people or pets in your home. After using these items, they should be washed thoroughly with soap and water. Clean all “high-touch” surfaces everyday    High touch surfaces include counters, tabletops, doorknobs, bathroom fixtures, toilets, phones, keyboards, tablets, and bedside tables. Also, clean any surfaces that may have blood, stool, or body fluids on them.  Use a household cleaning spray or wipe, according to the label instructions. Labels contain instructions for safe and effective use of the cleaning product including precautions you should take when applying the product, such as wearing gloves and making sure you have good ventilation during use of the product. Monitor your symptoms    Seek prompt medical attention if your illness is worsening (e.g., difficulty breathing). Before seeking care, call your healthcare provider and tell them that you have, or are being evaluated for, COVID-19. Put on a facemask before you enter the facility. These steps will help the healthcare provider’s office to keep other people in the office or waiting room from getting infected or exposed. Ask your healthcare provider to call the local or state health department. Persons who are placed under active monitoring or facilitated self-monitoring should follow instructions provided by their local health department or occupational health professionals, as appropriate. If you have a medical emergency and need to call 911, notify the dispatch personnel that you have, or are being evaluated for COVID-19. If possible, put on a facemask before emergency medical services arrive. Discontinuing home isolation    Patients with confirmed COVID-19 should remain under home isolation precautions until the following conditions are met:    They have had no fever for at least 24 hours (that is one full day of no fever without the use medicine that reduces fevers)  AND  other symptoms have improved (for example, when their cough or shortness of breath have improved)  AND  If had mild or moderate illness, at least 10 days have passed since their symptoms first appeared or if severe illness (needed oxygen) or immunosuppressed, at least 20 days have passed since symptoms first appeared  Patients with confirmed COVID-19 should also notify close contacts (including their workplace) and ask that they self-quarantine. Currently, close contact is defined as being within 6 feet for 15 minutes or more from the period 24 hours starting 48 hours before symptom onset to the time at which the patient went into isolation. Close contacts of patients diagnosed with COVID-19 should be instructed by the patient to self-quarantine for 14 days from the last time of their last contact with the patient. Source: [employer]     She will start on vitamin C D and zinc.  We discussed Paxlovid, she has declined. Sending in Zofran.

## 2024-02-21 PROBLEM — J06.9 ACUTE URI: Status: RESOLVED | Noted: 2019-01-03 | Resolved: 2024-02-21

## 2024-07-19 ENCOUNTER — APPOINTMENT (OUTPATIENT)
Dept: RADIOLOGY | Facility: CLINIC | Age: 64
End: 2024-07-19
Payer: COMMERCIAL

## 2024-07-19 DIAGNOSIS — M99.03 SOMATIC DYSFUNCTION OF LUMBAR REGION: ICD-10-CM

## 2024-07-19 DIAGNOSIS — R29.3 ABNORMAL POSTURE: ICD-10-CM

## 2024-07-19 DIAGNOSIS — R26.9 ABNORMALITY OF GAIT: ICD-10-CM

## 2024-07-19 DIAGNOSIS — M62.830 BACK MUSCLE SPASM: ICD-10-CM

## 2024-07-19 PROCEDURE — 72050 X-RAY EXAM NECK SPINE 4/5VWS: CPT

## 2024-07-19 PROCEDURE — 72100 X-RAY EXAM L-S SPINE 2/3 VWS: CPT

## 2025-01-14 ENCOUNTER — TELEPHONE (OUTPATIENT)
Dept: FAMILY MEDICINE CLINIC | Facility: CLINIC | Age: 65
End: 2025-01-14

## 2025-01-14 NOTE — TELEPHONE ENCOUNTER
Spoke to patient in regard to setting up appointment. Patient hasn't been seen since 5/2/23. Patient does not want to set up appointment currently, will call in future.

## 2025-04-22 ENCOUNTER — APPOINTMENT (OUTPATIENT)
Dept: LAB | Facility: CLINIC | Age: 65
End: 2025-04-22
Payer: COMMERCIAL

## 2025-04-22 ENCOUNTER — OFFICE VISIT (OUTPATIENT)
Dept: FAMILY MEDICINE CLINIC | Facility: CLINIC | Age: 65
End: 2025-04-22
Payer: COMMERCIAL

## 2025-04-22 VITALS
HEIGHT: 69 IN | DIASTOLIC BLOOD PRESSURE: 60 MMHG | OXYGEN SATURATION: 99 % | WEIGHT: 134.2 LBS | HEART RATE: 89 BPM | SYSTOLIC BLOOD PRESSURE: 110 MMHG | BODY MASS INDEX: 19.88 KG/M2 | TEMPERATURE: 98.9 F | RESPIRATION RATE: 18 BRPM

## 2025-04-22 DIAGNOSIS — R00.2 PALPITATIONS: Primary | ICD-10-CM

## 2025-04-22 DIAGNOSIS — F43.9 STRESS AT HOME: ICD-10-CM

## 2025-04-22 DIAGNOSIS — R00.2 PALPITATIONS: ICD-10-CM

## 2025-04-22 DIAGNOSIS — Z13.220 SCREENING, LIPID: ICD-10-CM

## 2025-04-22 LAB
ALBUMIN SERPL BCG-MCNC: 4.3 G/DL (ref 3.5–5)
ALP SERPL-CCNC: 83 U/L (ref 34–104)
ALT SERPL W P-5'-P-CCNC: 13 U/L (ref 7–52)
ANION GAP SERPL CALCULATED.3IONS-SCNC: 8 MMOL/L (ref 4–13)
AST SERPL W P-5'-P-CCNC: 17 U/L (ref 13–39)
BASOPHILS # BLD AUTO: 0.08 THOUSANDS/ÂΜL (ref 0–0.1)
BASOPHILS NFR BLD AUTO: 1 % (ref 0–1)
BILIRUB SERPL-MCNC: 0.98 MG/DL (ref 0.2–1)
BUN SERPL-MCNC: 14 MG/DL (ref 5–25)
CALCIUM SERPL-MCNC: 9.7 MG/DL (ref 8.4–10.2)
CHLORIDE SERPL-SCNC: 105 MMOL/L (ref 96–108)
CHOLEST SERPL-MCNC: 193 MG/DL (ref ?–200)
CO2 SERPL-SCNC: 27 MMOL/L (ref 21–32)
CREAT SERPL-MCNC: 0.64 MG/DL (ref 0.6–1.3)
EOSINOPHIL # BLD AUTO: 0.18 THOUSAND/ÂΜL (ref 0–0.61)
EOSINOPHIL NFR BLD AUTO: 2 % (ref 0–6)
ERYTHROCYTE [DISTWIDTH] IN BLOOD BY AUTOMATED COUNT: 14.4 % (ref 11.6–15.1)
GFR SERPL CREATININE-BSD FRML MDRD: 94 ML/MIN/1.73SQ M
GLUCOSE P FAST SERPL-MCNC: 86 MG/DL (ref 65–99)
HCT VFR BLD AUTO: 42.1 % (ref 34.8–46.1)
HDLC SERPL-MCNC: 68 MG/DL
HGB BLD-MCNC: 13.9 G/DL (ref 11.5–15.4)
IMM GRANULOCYTES # BLD AUTO: 0.02 THOUSAND/UL (ref 0–0.2)
IMM GRANULOCYTES NFR BLD AUTO: 0 % (ref 0–2)
LDLC SERPL CALC-MCNC: 108 MG/DL (ref 0–100)
LYMPHOCYTES # BLD AUTO: 2.39 THOUSANDS/ÂΜL (ref 0.6–4.47)
LYMPHOCYTES NFR BLD AUTO: 32 % (ref 14–44)
MAGNESIUM SERPL-MCNC: 2.2 MG/DL (ref 1.9–2.7)
MCH RBC QN AUTO: 30.5 PG (ref 26.8–34.3)
MCHC RBC AUTO-ENTMCNC: 33 G/DL (ref 31.4–37.4)
MCV RBC AUTO: 93 FL (ref 82–98)
MONOCYTES # BLD AUTO: 0.5 THOUSAND/ÂΜL (ref 0.17–1.22)
MONOCYTES NFR BLD AUTO: 7 % (ref 4–12)
NEUTROPHILS # BLD AUTO: 4.32 THOUSANDS/ÂΜL (ref 1.85–7.62)
NEUTS SEG NFR BLD AUTO: 58 % (ref 43–75)
NRBC BLD AUTO-RTO: 0 /100 WBCS
PHOSPHATE SERPL-MCNC: 3.8 MG/DL (ref 2.3–4.1)
PLATELET # BLD AUTO: 342 THOUSANDS/UL (ref 149–390)
PMV BLD AUTO: 9.7 FL (ref 8.9–12.7)
POTASSIUM SERPL-SCNC: 4.4 MMOL/L (ref 3.5–5.3)
PROT SERPL-MCNC: 6.9 G/DL (ref 6.4–8.4)
RBC # BLD AUTO: 4.55 MILLION/UL (ref 3.81–5.12)
SODIUM SERPL-SCNC: 140 MMOL/L (ref 135–147)
TRIGL SERPL-MCNC: 86 MG/DL (ref ?–150)
TSH SERPL DL<=0.05 MIU/L-ACNC: 0.78 UIU/ML (ref 0.45–4.5)
WBC # BLD AUTO: 7.49 THOUSAND/UL (ref 4.31–10.16)

## 2025-04-22 PROCEDURE — 83735 ASSAY OF MAGNESIUM: CPT

## 2025-04-22 PROCEDURE — 85025 COMPLETE CBC W/AUTO DIFF WBC: CPT

## 2025-04-22 PROCEDURE — 84100 ASSAY OF PHOSPHORUS: CPT

## 2025-04-22 PROCEDURE — 99214 OFFICE O/P EST MOD 30 MIN: CPT | Performed by: NURSE PRACTITIONER

## 2025-04-22 PROCEDURE — 84443 ASSAY THYROID STIM HORMONE: CPT

## 2025-04-22 PROCEDURE — 80061 LIPID PANEL: CPT

## 2025-04-22 PROCEDURE — 80053 COMPREHEN METABOLIC PANEL: CPT

## 2025-04-22 PROCEDURE — 36415 COLL VENOUS BLD VENIPUNCTURE: CPT

## 2025-04-22 NOTE — PROGRESS NOTES
Name: Helen Garrido      : 1960      MRN: [id number]  Encounter Provider: Evi Srinivasan DNP  Encounter Date: 2025   Encounter department: [facility]  :  Assessment & Plan  Palpitations  New onset, occurring for one week  Needs workup  Orders:    CBC and differential; Future    Comprehensive metabolic panel; Future    TSH, 3rd generation with Free T4 reflex; Future    Phosphorus; Future    Magnesium; Future    Holter monitor; Future    Screening, lipid    Orders:    Lipid Panel with Direct LDL reflex; Future    Stress at home  Does report increase medical conditions with friends, co-workers  Does report increase worry and stress  Reports increase stress due to politics   Underlying anxiety, discussed CBT                History of Present Illness   Palpitations started one week ago, she reports noticing when sitting down, does not notice when active. She reports feeling her heart race.   She reports palpitations lasting hours. She denies sob, cough, dizziness. No recent otc medication, minimal caffeine intake   No recent uri. Does report stress, hx of anxiety       Palpitations  This is a new problem. The current episode started in the past 7 days. The problem occurs daily. The problem has been unchanged. Pertinent negatives include no abdominal pain, arthralgias, chest pain, chills, congestion, coughing, fatigue, fever, headaches, myalgias, nausea, neck pain, numbness, sore throat, vomiting or weakness. Nothing aggravates the symptoms. She has tried nothing for the symptoms. The treatment provided no relief.     Review of Systems   Constitutional:  Negative for activity change, chills, fatigue and fever.   HENT:  Negative for congestion, ear discharge, ear pain, sinus pressure, sinus pain, sore throat, tinnitus and trouble swallowing.    Eyes:  Negative for photophobia, pain, discharge, itching and visual disturbance.   Respiratory:  Negative for cough, chest  "tightness, shortness of breath and wheezing.    Cardiovascular:  Positive for palpitations. Negative for chest pain and leg swelling.   Gastrointestinal:  Negative for abdominal distention, abdominal pain, constipation, diarrhea, nausea and vomiting.   Endocrine: Negative for polydipsia, polyphagia and polyuria.   Genitourinary:  Negative for dysuria and frequency.   Musculoskeletal:  Negative for arthralgias, myalgias, neck pain and neck stiffness.   Skin:  Negative for color change.   Neurological:  Negative for dizziness, syncope, weakness, numbness and headaches.   Hematological:  Does not bruise/bleed easily.   Psychiatric/Behavioral:  Negative for behavioral problems, confusion, self-injury, sleep disturbance and suicidal ideas. The patient is not nervous/anxious.        Objective   /60 (BP Location: Left arm, Patient Position: Sitting, Cuff Size: Standard)   Pulse 89   Temp 98.9 °F (37.2 °C) (Tympanic)   Resp 18   Ht 5' 9\" (1.753 m)   Wt 60.9 kg (134 lb 3.2 oz)   SpO2 99%   BMI 19.82 kg/m²      Physical Exam  Vitals and nursing note reviewed.   Constitutional:       Appearance: Normal appearance.   HENT:      Head: Normocephalic and atraumatic.   Cardiovascular:      Rate and Rhythm: Normal rate and regular rhythm.      Pulses: Normal pulses.      Heart sounds: Normal heart sounds.   Pulmonary:      Effort: Pulmonary effort is normal.      Breath sounds: Normal breath sounds.   Skin:     General: Skin is warm.   Neurological:      General: No focal deficit present.      Mental Status: She is alert and oriented to person, place, and time.   Psychiatric:         Behavior: Behavior normal.         Answers submitted by the patient for this visit:  Annual Physical (Submitted on 4/21/2025)  Diet/Nutrition choices: no special diet  Exercise choices: walking, 3-4 times a week on average  Sleep choices: sleeps well  Hearing choices: normal hearing right ear, normal hearing left ear  Vision choices: most " recent eye exam < 1 year ago, wears glasses  Dental choices: brushes teeth twice daily, floss regularly  Do you currently have an OB/GYN provider that you routinely follow with?: No  Menopausal status: postmenopausal  Any history of sexual transmitted disease/infection?: No  Do you have an advance directive/living will?: No  Do you have a durable power of  (POA)?: No

## 2025-04-23 ENCOUNTER — RESULTS FOLLOW-UP (OUTPATIENT)
Dept: FAMILY MEDICINE CLINIC | Facility: CLINIC | Age: 65
End: 2025-04-23

## 2025-04-23 NOTE — RESULT ENCOUNTER NOTE
Please let her know overall her blood work is stable, LDL is slightly elevated although much improved since last lab work.  Heart healthy diet.  Will reach out once Holter monitor is released and read by cardiology

## 2025-05-05 ENCOUNTER — HOSPITAL ENCOUNTER (OUTPATIENT)
Dept: NON INVASIVE DIAGNOSTICS | Facility: HOSPITAL | Age: 65
Discharge: HOME | End: 2025-05-05
Attending: NURSE PRACTITIONER
Payer: COMMERCIAL

## 2025-05-05 DIAGNOSIS — R00.2 PALPITATIONS: ICD-10-CM

## 2025-05-05 PROCEDURE — 93225 XTRNL ECG REC<48 HRS REC: CPT

## 2025-05-05 PROCEDURE — 93226 XTRNL ECG REC<48 HR SCAN A/R: CPT

## 2025-05-15 PROCEDURE — 93227 XTRNL ECG REC<48 HR R&I: CPT | Performed by: STUDENT IN AN ORGANIZED HEALTH CARE EDUCATION/TRAINING PROGRAM

## 2025-05-16 ENCOUNTER — TELEPHONE (OUTPATIENT)
Age: 65
End: 2025-05-16

## 2025-05-16 NOTE — RESULT ENCOUNTER NOTE
Overall Holter monitor is normal, no abnormal rhythm identified. Can follow up with cardiology if remains occurring for a longer Holter monitor

## 2025-05-22 NOTE — TELEPHONE ENCOUNTER
Patient called asking if someone could reach out to her and explain the results of her Holter monitor. She is concerned as she was looking over it and saw bradycardia episodes and other abnormalities. She would like a call back to discuss, [phone].